# Patient Record
Sex: MALE | Race: WHITE | NOT HISPANIC OR LATINO
[De-identification: names, ages, dates, MRNs, and addresses within clinical notes are randomized per-mention and may not be internally consistent; named-entity substitution may affect disease eponyms.]

---

## 2022-09-27 ENCOUNTER — NON-APPOINTMENT (OUTPATIENT)
Age: 67
End: 2022-09-27

## 2022-09-28 ENCOUNTER — NON-APPOINTMENT (OUTPATIENT)
Age: 67
End: 2022-09-28

## 2022-09-28 ENCOUNTER — APPOINTMENT (OUTPATIENT)
Dept: UROLOGY | Facility: CLINIC | Age: 67
End: 2022-09-28

## 2022-09-28 ENCOUNTER — TRANSCRIPTION ENCOUNTER (OUTPATIENT)
Age: 67
End: 2022-09-28

## 2022-09-28 VITALS
HEIGHT: 72 IN | HEART RATE: 71 BPM | SYSTOLIC BLOOD PRESSURE: 156 MMHG | DIASTOLIC BLOOD PRESSURE: 86 MMHG | BODY MASS INDEX: 20.32 KG/M2 | OXYGEN SATURATION: 98 % | RESPIRATION RATE: 16 BRPM | TEMPERATURE: 97.6 F | WEIGHT: 150 LBS

## 2022-09-28 DIAGNOSIS — K40.90 UNILATERAL INGUINAL HERNIA, W/OUT OBSTRUCTION OR GANGRENE, NOT SPECIFIED AS RECURRENT: ICD-10-CM

## 2022-09-28 DIAGNOSIS — R82.81 PYURIA: ICD-10-CM

## 2022-09-28 DIAGNOSIS — K42.9 UMBILICAL HERNIA W/OUT OBSTRUCTION OR GANGRENE: ICD-10-CM

## 2022-09-28 PROCEDURE — 99204 OFFICE O/P NEW MOD 45 MIN: CPT

## 2022-09-28 NOTE — HISTORY OF PRESENT ILLNESS
[FreeTextEntry1] : 66 YO M seen TODAY 9/28/2022 as NPT for urological evaluation with a history of renal stones in the past. He told me that he developed severe dysuria over Labor Day weekend and was seen in [facility] ER 9/6/2022. Blood work and CT scan were consistent with cystitis/prostatitis and he was treated with Bactrim DS for 14 days.  He continues to have mild LUTS with a slow urinary stream. \par \par CT scan also noted 12 mm left renal calculus and 5 mm right renal calculus, no hydronephrosis, simple right renal cysts. \par UA moderate RBC, LARGE WBC, small protein. \par IPSS 10\par SAMSON 2\par KVNG 5 (Patient not presently engaging in sexual intercourse). \par \par Patient has an autoimmune disease for which he is on chronic cortisone and narcotic analgesia. He is also on gabapentin and thyroid medication. allergic to ibuprofen. He also has bee diagnosed with umbilical and LIH. He recently contracted COVID-19, diagnosed while in the ED. \par \par  The patient denies fevers, chills, nausea and or vomiting and no unexplained weight loss. \par All pertinent parts of the patient PFSH (past medical, family and social histories), laboratory, radiological studies and physician notes were reviewed prior to starting the face to face portion of the  visit. Questionnaire results were discussed with patient.\par

## 2022-09-28 NOTE — LETTER BODY
[Dear  ___] : Dear  [unfilled], [Consult Letter:] : I had the pleasure of evaluating your patient, [unfilled]. [Please see my note below.] : Please see my note below. [Consult Closing:] : Thank you very much for allowing me to participate in the care of this patient.  If you have any questions, please do not hesitate to contact me. [FreeTextEntry3] : Best Regards, \par \par Felicitas Brunson MD\par

## 2022-09-28 NOTE — ASSESSMENT
[FreeTextEntry1] : We discussed the patient's recent episode of urinary tract infection and likely prostatitis.  This may or may not be related to his bilateral renal calculi.  I recommended further evaluation of these 2 issues especially in view of his persistent large pyuria found today.  To that end urine was sent for culture and I recommended that he return for uroflowmetry renal and pelvic ultrasound.  These were scheduled for him.  I would also review the results of the CT scan performed at [facility] on 9 6 and these films are requested.\par \par As for the umbilical and left inguinal hernias, these are asymptomatic patient was instructed with signs and symptoms of incarceration but would like to observe these at the present time.  We made plans for follow-up to review these results when he comes in for the ultrasounds. Felicitas Brunson MD\par

## 2022-09-28 NOTE — PHYSICAL EXAM
[Normal Appearance] : normal appearance [Well Groomed] : well groomed [General Appearance - In No Acute Distress] : no acute distress [Edema] : no peripheral edema [Respiration, Rhythm And Depth] : normal respiratory rhythm and effort [Exaggerated Use Of Accessory Muscles For Inspiration] : no accessory muscle use [Abdomen Soft] : soft [Abdomen Tenderness] : non-tender [Costovertebral Angle Tenderness] : no ~M costovertebral angle tenderness [FreeTextEntry1] : umbilical and LIH, both asymptomatic, neither incarcerated. [Urethral Meatus] : meatus normal [Penis Abnormality] : normal circumcised penis [Urinary Bladder Findings] : the bladder was normal on palpation [Epididymis] : the epididymides were normal [Testes Tenderness] : no tenderness of the testes [Testes Mass (___cm)] : there were no testicular masses [Prostate Tenderness] : the prostate was not tender [No Prostate Nodules] : no prostate nodules [Prostate Size ___ (0-4)] : prostate size [unfilled] (scale: 0-4) [Normal Station and Gait] : the gait and station were normal for the patient's age [] : no rash [No Focal Deficits] : no focal deficits [Oriented To Time, Place, And Person] : oriented to person, place, and time [Affect] : the affect was normal [Mood] : the mood was normal [Not Anxious] : not anxious

## 2022-09-30 LAB
BACTERIA UR CULT: NORMAL
BILIRUB UR QL STRIP: NORMAL
CLARITY UR: CLEAR
COLLECTION METHOD: NORMAL
GLUCOSE UR-MCNC: NORMAL
HCG UR QL: 0.2 EU/DL
HGB UR QL STRIP.AUTO: NORMAL
KETONES UR-MCNC: NORMAL
LEUKOCYTE ESTERASE UR QL STRIP: NORMAL
NITRITE UR QL STRIP: NORMAL
PH UR STRIP: 6
PROT UR STRIP-MCNC: NORMAL
PSA SERPL-MCNC: 5.64 NG/ML
SP GR UR STRIP: 1.02

## 2022-10-27 ENCOUNTER — APPOINTMENT (OUTPATIENT)
Dept: UROLOGY | Facility: CLINIC | Age: 67
End: 2022-10-27

## 2022-10-27 VITALS
SYSTOLIC BLOOD PRESSURE: 153 MMHG | RESPIRATION RATE: 16 BRPM | BODY MASS INDEX: 20.32 KG/M2 | OXYGEN SATURATION: 100 % | HEIGHT: 72 IN | TEMPERATURE: 97.8 F | DIASTOLIC BLOOD PRESSURE: 78 MMHG | HEART RATE: 74 BPM | WEIGHT: 150 LBS

## 2022-10-27 DIAGNOSIS — N41.9 INFLAMMATORY DISEASE OF PROSTATE, UNSPECIFIED: ICD-10-CM

## 2022-10-27 PROCEDURE — 51741 ELECTRO-UROFLOWMETRY FIRST: CPT

## 2022-10-27 PROCEDURE — 76857 US EXAM PELVIC LIMITED: CPT

## 2022-10-27 PROCEDURE — 99214 OFFICE O/P EST MOD 30 MIN: CPT

## 2022-10-27 NOTE — ASSESSMENT
[FreeTextEntry1] : We discussed findings today of the patient's persistent pyuria and hematuria and I recommended repeat culture and cytology today.  We also discussed his borderline elevated PSA I recommended a repeat total and free PSA today also these tests were all performed.  These findings are consistent with his recent episode of cystitis/prostatitis and we will follow the PSA to its dede.\par \par As for his CT findings of bilateral renal calculi, I could not confirm this by reviewing the CT images in the office today however he will email those to me this evening and hopefully we will be able to confirm these images.  We were unable to get the images released from [facility] as of today.  We discussed treatment of the stones and the best likely approach would be cystoscopy bilateral ureteroscopy laser lithotripsy stone basket extraction and JJ stent placement.  We did not make plans pending review of the images.  We discussed the indications risks alternatives and chances for success with this approach and the patient is in agreement with this treatment plan.  We will finalize the plan and pick a date after reviewing the scan results.  Surgery will be scheduled for after his prostatitis is completely resolved and PSA has returned to baseline. Felicitas Brunson MD\par

## 2022-10-27 NOTE — LETTER BODY
[Dear  ___] : Dear  [unfilled], [Courtesy Letter:] : I had the pleasure of seeing your patient, [unfilled], in my office today. [Please see my note below.] : Please see my note below. [Consult Closing:] : Thank you very much for allowing me to participate in the care of this patient.  If you have any questions, please do not hesitate to contact me. [FreeTextEntry3] : Best Regards, \par \par Felicitas Brunson MD\par

## 2022-10-27 NOTE — HISTORY OF PRESENT ILLNESS
[FreeTextEntry1] : 66 YO M seen  9/28/2022 as NPT for urological evaluation with a history of renal stones in the past. He told me that he developed severe dysuria over Labor Day weekend and was seen in [facility] ER 9/6/2022. Blood work and CT scan were consistent with cystitis/prostatitis and he was treated with Bactrim DS for 14 days.  He continues to have mild LUTS with a slow urinary stream. \par \par CT scan also noted 12 mm left renal calculus and 5 mm right renal calculus, no hydronephrosis, simple right renal cysts. \par UA moderate RBC, LARGE WBC, small protein. \par IPSS 10\par SAMSON 2\par KVNG 5 (Patient not presently engaging in sexual intercourse). \par We discussed the patient's recent episode of urinary tract infection and likely prostatitis. This may or may not be related to his bilateral renal calculi. I recommended further evaluation of these 2 issues especially in view of his persistent large pyuria found today. To that end urine was sent for culture and I recommended that he return for uroflowmetry renal and pelvic ultrasound. These were scheduled for him. I would also review the results of the CT scan performed at [facility] on 9 6 and these films are requested.\par As for the umbilical and left inguinal hernias, these are asymptomatic patient was instructed with signs and symptoms of incarceration but would like to observe these at the present time. We made plans for follow-up to review these results when he comes in for the ultrasounds. \par C+S negative\par PSA 5.64\par Patient seen TODAY 10/27/2022 for repeat PSA, uroflow, pelvic US and to discuss treatment options for his bilateral renal calculi. He remains asymptomatic from the stones. He has the images on his phone and will email them to me this evening. \par UA  small RBC, large WBC.\par URoflow good: Vol 369 ml, V Max 15 ml/s, V Ave 7 ml/s.\par Pelvic US: PVR 20 ml, PRostate 39 gm.\par IPSS 2\par \par Patient has an autoimmune disease for which he is on chronic cortisone and narcotic analgesia. He is also on gabapentin and thyroid medication. allergic to ibuprofen. He also has bee diagnosed with umbilical and LIH. He recently contracted COVID-19, diagnosed while in the ED. \par  The patient denies fevers, chills, nausea and or vomiting and no unexplained weight loss. \par All pertinent parts of the patient PFSH (past medical, family and social histories), laboratory, radiological studies and physician notes were reviewed prior to starting the face to face portion of the  visit. Questionnaire results were discussed with patient.\par

## 2022-10-28 ENCOUNTER — NON-APPOINTMENT (OUTPATIENT)
Age: 67
End: 2022-10-28

## 2022-10-28 LAB
BILIRUB UR QL STRIP: NORMAL
CLARITY UR: CLEAR
COLLECTION METHOD: NORMAL
GLUCOSE UR-MCNC: NORMAL
HCG UR QL: 0.2 EU/DL
HGB UR QL STRIP.AUTO: NORMAL
KETONES UR-MCNC: NORMAL
LEUKOCYTE ESTERASE UR QL STRIP: NORMAL
NITRITE UR QL STRIP: NORMAL
PH UR STRIP: 6
PROT UR STRIP-MCNC: NORMAL
PSA FREE FLD-MCNC: 8 %
PSA FREE SERPL-MCNC: 0.37 NG/ML
PSA SERPL-MCNC: 4.55 NG/ML
SP GR UR STRIP: 1.01

## 2022-11-04 LAB — BACTERIA UR CULT: ABNORMAL

## 2022-11-14 ENCOUNTER — APPOINTMENT (OUTPATIENT)
Dept: MRI IMAGING | Facility: CLINIC | Age: 67
End: 2022-11-14

## 2023-01-09 ENCOUNTER — OUTPATIENT (OUTPATIENT)
Dept: OUTPATIENT SERVICES | Facility: HOSPITAL | Age: 68
LOS: 1 days | End: 2023-01-09

## 2023-01-09 ENCOUNTER — RESULT REVIEW (OUTPATIENT)
Age: 68
End: 2023-01-09

## 2023-01-09 ENCOUNTER — APPOINTMENT (OUTPATIENT)
Dept: MRI IMAGING | Facility: CLINIC | Age: 68
End: 2023-01-09
Payer: MEDICARE

## 2023-01-09 ENCOUNTER — APPOINTMENT (OUTPATIENT)
Dept: ULTRASOUND IMAGING | Facility: CLINIC | Age: 68
End: 2023-01-09
Payer: MEDICARE

## 2023-01-09 PROCEDURE — 72197 MRI PELVIS W/O & W/DYE: CPT | Mod: 26,MH

## 2023-01-09 PROCEDURE — 76856 US EXAM PELVIC COMPLETE: CPT | Mod: 26

## 2023-01-11 ENCOUNTER — NON-APPOINTMENT (OUTPATIENT)
Age: 68
End: 2023-01-11

## 2023-01-20 ENCOUNTER — APPOINTMENT (OUTPATIENT)
Dept: UROLOGY | Facility: CLINIC | Age: 68
End: 2023-01-20
Payer: MEDICARE

## 2023-01-20 PROCEDURE — 99214 OFFICE O/P EST MOD 30 MIN: CPT

## 2023-01-20 NOTE — HISTORY OF PRESENT ILLNESS
[FreeTextEntry1] : 66 YO M seen  9/28/2022 as NPT for urological evaluation with a history of renal stones in the past. He told me that he developed severe dysuria over Labor Day weekend and was seen in [facility] ER 9/6/2022. Blood work and CT scan were consistent with cystitis/prostatitis and he was treated with Bactrim DS for 14 days.  He continues to have mild LUTS with a slow urinary stream. \par \par CT scan also noted 12 mm left renal calculus and 5 mm right renal calculus, no hydronephrosis, simple right renal cysts. \par UA moderate RBC, LARGE WBC, small protein. \par IPSS 10\par SAMSON 2\par KVNG 5 (Patient not presently engaging in sexual intercourse). \par We discussed the patient's recent episode of urinary tract infection and likely prostatitis. This may or may not be related to his bilateral renal calculi. I recommended further evaluation of these 2 issues especially in view of his persistent large pyuria found today. To that end urine was sent for culture and I recommended that he return for uroflowmetry renal and pelvic ultrasound. These were scheduled for him. I would also review the results of the CT scan performed at [facility] on 9 6 and these films are requested.\par As for the umbilical and left inguinal hernias, these are asymptomatic patient was instructed with signs and symptoms of incarceration but would like to observe these at the present time. We made plans for follow-up to review these results when he comes in for the ultrasounds. \par C+S negative\par PSA 5.64\par Patient seen  10/27/2022 for repeat PSA, uroflow, pelvic US and to discuss treatment options for his bilateral renal calculi. He remains asymptomatic from the stones. He has the images on his phone and will email them to me this evening. \par UA  small RBC, large WBC.\par URoflow good: Vol 369 ml, V Max 15 ml/s, V Ave 7 ml/s.\par Pelvic US: PVR 20 ml, PRostate 39 gm.\par IPSS 2We discussed findings today of the patient's persistent pyuria and hematuria and I recommended repeat culture and cytology today. We also discussed his borderline elevated PSA I recommended a repeat total and free PSA today also these tests were all performed. These findings are consistent with his recent episode of cystitis/prostatitis and we will follow the PSA to its dede.\par \par As for his CT findings of bilateral renal calculi, I could not confirm this by reviewing the CT images in the office today however he will email those to me this evening and hopefully we will be able to confirm these images. We were unable to get the images released from [facility] as of today. We discussed treatment of the stones and the best likely approach would be cystoscopy bilateral ureteroscopy laser lithotripsy stone basket extraction and JJ stent placement. We did not make plans pending review of the images. We discussed the indications risks alternatives and chances for success with this approach and the patient is in agreement with this treatment plan. We will finalize the plan and pick a date after reviewing the scan results. Surgery will be scheduled for after his prostatitis is completely resolved and PSA has returned to baseline. \par PSA 4.55 (8%)\par C+S 10 - 49 K candida albicans Rx'd wit Fluconazole. \par \par 10/28/2022  PSA returned improved but still high at 4.55.\par CT scans reviewed via email from patient. multiple stones in the left kidney, at least 1 in the right kidney. No Hydro or renal masses noted. MAss effect in area of the prostatic base ans left SV. I phoned patient, left VM with results and recommended prostate and seminal vesical MRI before we treat his bilateral renal calculi.. \par \par MRI 1/9/2023:\par FINDINGS:\par Size: 4.8 x 3.8 x 3.3 [transverse x AP x CC] cm.\par Volume: 31.4 mL .\par PSA density: 0.14 ng/mL/mL\par PSA density >0.15 ng/mL/mL: No\par Hemorrhage: None.\par Central gland: Mild BPH.\par Peripheral zone:\par LESION: #1\par Location: Left lateral peripheral zone at apex\par Slice#: Series seven, Image 22.\par Size (transverse, AP, ): 4.2 x 4 mm.\par T2-WI: Moderately hypointense\par DWI: Moderately hypointense on ADC map and moderately hyperintense on DWI.\par DCE: Early focal enhancement\par Extra-prostatic extension: None\par PI-RADS Assessment Category: 4\par Neurovascular bundle: No evidence of neurovascular bundle invasion.\par Seminal vesicles: No seminal vesicle invasion. Blood products in the right seminal vesicle.\par Lymph nodes: No pelvic adenopathy.\par Bones: 1.2 cm enhancing focus in the right posterior acetabulum-indeterminate. Correlate with CT and isotope bone scan.\par Urinary bladder: Mild diffuse urothelial enhancement.\par Other:\par IMPRESSION:PIRADS 4 -high. Small nodule left lateral peripheral zone at apex.\par Normal PSA density.\par Hemorrhage in the right seminal vesicle.\par See further comments above. \par \par Patient seen TODAY 1/20/2023 to review and discuss these results. He spontaneously passed a stone since last visit. No other issues.\par UA moderate RBC\par \par Patient has an autoimmune disease for which he is on chronic cortisone and narcotic analgesia. He is also on gabapentin and thyroid medication. allergic to ibuprofen. He also has bee diagnosed with umbilical and LIH. He recently contracted COVID-19, diagnosed while in the ED. \par  The patient denies fevers, chills, nausea and or vomiting and no unexplained weight loss. \par All pertinent parts of the patient PFSH (past medical, family and social histories), laboratory, radiological studies and physician notes were reviewed prior to starting the face to face portion of the  visit. Questionnaire results were discussed with patient.\par

## 2023-01-20 NOTE — ASSESSMENT
[FreeTextEntry1] : We discussed the results of the MRI which returned PI-RADS =4.  I recommended that he consider a prostate fusion biopsy and we discussed the risks alternatives the chance for success along with the indications including the abnormal MRI and elevated PSA. We also discussed the PI-RADS  rating, and the different types of potential cancer that might be found.   He is in agreement with this and we made plans for him to be seen and biopsied by my partner, Dr. Alfredo Masters.  Staff will contact him and confirm this appointment.\par \par As for the kidney stones, we will address these after the prostate issue has been resolved.  In the meanwhile I asked him to bring in the stone that he passed for analysis. Felicitas Brunson MD\par

## 2023-03-09 ENCOUNTER — LABORATORY RESULT (OUTPATIENT)
Age: 68
End: 2023-03-09

## 2023-03-13 ENCOUNTER — APPOINTMENT (OUTPATIENT)
Dept: UROLOGY | Facility: CLINIC | Age: 68
End: 2023-03-13
Payer: MEDICARE

## 2023-03-13 VITALS — DIASTOLIC BLOOD PRESSURE: 70 MMHG | SYSTOLIC BLOOD PRESSURE: 116 MMHG | HEART RATE: 83 BPM | TEMPERATURE: 98.5 F

## 2023-03-13 PROCEDURE — 99214 OFFICE O/P EST MOD 30 MIN: CPT

## 2023-03-13 NOTE — PHYSICAL EXAM
[General Appearance - Well Developed] : well developed [General Appearance - Well Nourished] : well nourished [] : no respiratory distress [Abdomen Soft] : soft [Urethral Meatus] : meatus normal [Penis Abnormality] : normal circumcised penis [Testes Tenderness] : no tenderness of the testes [Prostate Tenderness] : the prostate was not tender [No Prostate Nodules] : no prostate nodules [Oriented To Time, Place, And Person] : oriented to person, place, and time [Not Anxious] : not anxious

## 2023-03-13 NOTE — ASSESSMENT
[FreeTextEntry1] : 67M w/ an elevated PSA and MRI showing a PIRADS 4 lesion L apex pzpl, however hx complicated by adrenal insufficiency.  The paitent while on ABX PSA dropped to 2.11 while having  UTI 3/9/23 (2022 Labor Day, ?? others). \par \par 1. UTI - 24 cc prostate - PVR - 0cc\par    - not sexual active\par    - UA UCx\par    - PVR and Flow at next visit\par \par Elevated PSA / Bone Lesion\par 1. confirm elevated - drawn today - we will have to determine next steps and let patient know\par 2. Indeterminate bony lesion-PSMA PET/CT after biopsy. Bone scan if negative pathology\par 3, BIOPSY ON HOLD UNTIL UTI has run its course.\par \par Pre-op\par - CBC, BMP, PSA, Covid Test, UA, UCx\par - Medical Clearance\par - TP biopsy at [facility]\par - Follow up 2 weeks after biopsy with his primary urologist or ourselves\par - We will call with the path results once they are resulted\par \par IRB Notification\par The patient has been made aware of the opportunity to participate in our MR US fusion guided biopsy trial testing the next generation biopsy technology.  This is an IRB approved trial (IRB #).  He is already being consented for a standard MR US fusion guided biopsy therefore there is no change in his clinical work flow.  He has been given a copy of the consent to review before the biopsy date and given an opportunity to contact us with any further questions.  He will be consented on the day of the procedure or electronically before the biopsy.\par \par He understands that many men with prostate cancer will die with the disease rather than of it and we also discussed the results large multi-center American and  prostate cancer screening trials. He also understands that PSA in and of itself does not diagnose prostate cancer but only assesses risk to a certain degree. The patient understands that to definitively screen for prostate cancer, a biopsy is required and this procedure has risks, including bleeding, infection, ED and urinary retention. The patient opted to move forward with the biopsy.\par \par The patient is aware to expect hematuria x 2 weeks and up to 4 weeks of hematospermia.  There is a risk of infection albeit much lower than a transrectal approach. In some cases patients can experience erectile dysfunction but this is usually self limiting.  Any fever/chills after the biopsy the patient is to contact the office and go to the ER for an immediate evaluation. He has been given paper instructions outlining these items - which includes medications to avoid prior to surgery.\par \par \par Sincerely,\par \par \par Alfredo Masters D.O.\par Professor of Urology and Radiology\par  of Urology at [facility]\par System Director for Prostate Cancer\par [address] [place] [address], [address] Floor [facility], [facility]\par Phone: [phone]

## 2023-03-13 NOTE — HISTORY OF PRESENT ILLNESS
[FreeTextEntry1] : Dear MERCEDES Iqbal\par \par Thank you so much for the referral to help care for your patient.\par \par Chief Complaint Biopsy Consult\par Date of first visit: 03/13/2023\par \par KEVIN HOLLEY is a 67 year old  gentleman who presents for biopsy consult.  He has Churg Michael disease (auto-immune vasculitis).  The patient was treated for a recent UTI and his PSA dropped to 2.11 3/9/23. The patient also has adrenal insufficiency.   \par \par His PSA is 2.1 ng/ml. MRI on 1/9/23 demonstrated a PIRADS 4 lesion left apex pzpl. There is a 1.2cm enhancing focus in the right acetabulum which is indeterminate. His PSA density is normal (0.06 ng/ml/cc). \par \par PSA Hx:\par 2.1 3/9/23. PSAD 0.06 ng/lm/cc\par 4.55  10/27/22 (urinary tract infection)\par 5.64  9/28/22\par \par US Pelvis - 1/9/2023-\par Prostate volume within normal limits. Probable dystrophic parenchymal calcification. No fluid collections.\par \par MRI at [facility] on 1/09/2023.  31.4 mL prostate with PIRADS 4 lesion measuring 4.2 mm in Left lateral peripheral zone at apex. No LAD No EPE, Bony Lesions : 1.2 cm enhancing focus in the right posterior acetabulum-indeterminate. Correlate with CT and isotope bone scan.  The images have been reviewed and clinical implications discussed with the patient.\par \par 03/13/2023\par IPSS 5 QOL 5   \par PVR - 0cc \par \par The patient denies fevers, chills, nausea and or vomiting and no unexplained weight loss.\par \par All pertinent laboratory, films and physician notes were reviewed.  Questionnaire results were discussed with patient.\par \par \par

## 2023-03-14 LAB
ANION GAP SERPL CALC-SCNC: 15 MMOL/L
APPEARANCE: ABNORMAL
BACTERIA: NEGATIVE
BASOPHILS # BLD AUTO: 0.05 K/UL
BASOPHILS NFR BLD AUTO: 0.7 %
BILIRUBIN URINE: ABNORMAL
BLOOD URINE: ABNORMAL
BUN SERPL-MCNC: 24 MG/DL
CALCIUM OXALATE CRYSTALS: ABNORMAL
CALCIUM SERPL-MCNC: 9.6 MG/DL
CHLORIDE SERPL-SCNC: 100 MMOL/L
CO2 SERPL-SCNC: 26 MMOL/L
COLOR: ABNORMAL
CREAT SERPL-MCNC: 1.18 MG/DL
EGFR: 68 ML/MIN/1.73M2
EOSINOPHIL # BLD AUTO: 0.03 K/UL
EOSINOPHIL NFR BLD AUTO: 0.4 %
GLUCOSE QUALITATIVE U: NEGATIVE
GLUCOSE SERPL-MCNC: 115 MG/DL
HCT VFR BLD CALC: 41 %
HGB BLD-MCNC: 12.6 G/DL
HYALINE CASTS: 10 /LPF
IMM GRANULOCYTES NFR BLD AUTO: 0.4 %
KETONES URINE: NORMAL
LEUKOCYTE ESTERASE URINE: ABNORMAL
LYMPHOCYTES # BLD AUTO: 0.97 K/UL
LYMPHOCYTES NFR BLD AUTO: 13 %
MAN DIFF?: NORMAL
MCHC RBC-ENTMCNC: 29.8 PG
MCHC RBC-ENTMCNC: 30.7 GM/DL
MCV RBC AUTO: 96.9 FL
MICROSCOPIC-UA: NORMAL
MONOCYTES # BLD AUTO: 0.63 K/UL
MONOCYTES NFR BLD AUTO: 8.4 %
NEUTROPHILS # BLD AUTO: 5.76 K/UL
NEUTROPHILS NFR BLD AUTO: 77.1 %
NITRITE URINE: NEGATIVE
PH URINE: 5.5
PLATELET # BLD AUTO: 487 K/UL
POTASSIUM SERPL-SCNC: 4.3 MMOL/L
PROTEIN URINE: ABNORMAL
PSA FREE FLD-MCNC: 14 %
PSA FREE SERPL-MCNC: 0.34 NG/ML
PSA SERPL-MCNC: 2.45 NG/ML
RBC # BLD: 4.23 M/UL
RBC # FLD: 12 %
RED BLOOD CELLS URINE: 4 /HPF
SODIUM SERPL-SCNC: 141 MMOL/L
SPECIFIC GRAVITY URINE: >=1.03
SQUAMOUS EPITHELIAL CELLS: 0 /HPF
URINE COMMENTS: NORMAL
UROBILINOGEN URINE: NORMAL
WBC # FLD AUTO: 7.47 K/UL
WHITE BLOOD CELLS URINE: 155 /HPF

## 2023-03-15 LAB — BACTERIA UR CULT: NORMAL

## 2023-03-17 ENCOUNTER — NON-APPOINTMENT (OUTPATIENT)
Age: 68
End: 2023-03-17

## 2023-03-30 ENCOUNTER — NON-APPOINTMENT (OUTPATIENT)
Age: 68
End: 2023-03-30

## 2023-03-30 DIAGNOSIS — Z82.0 FAMILY HISTORY OF EPILEPSY AND OTHER DISEASES OF THE NERVOUS SYSTEM: ICD-10-CM

## 2023-03-30 DIAGNOSIS — Z87.09 PERSONAL HISTORY OF OTHER DISEASES OF THE RESPIRATORY SYSTEM: ICD-10-CM

## 2023-03-30 DIAGNOSIS — M30.1 POLYARTERITIS WITH LUNG INVOLVEMENT [CHURG-STRAUSS]: ICD-10-CM

## 2023-03-30 DIAGNOSIS — Z82.49 FAMILY HISTORY OF ISCHEMIC HEART DISEASE AND OTHER DISEASES OF THE CIRCULATORY SYSTEM: ICD-10-CM

## 2023-03-30 DIAGNOSIS — D72.18 POLYARTERITIS WITH LUNG INVOLVEMENT [CHURG-STRAUSS]: ICD-10-CM

## 2023-03-30 RX ORDER — LOSARTAN POTASSIUM AND HYDROCHLOROTHIAZIDE 12.5; 5 MG/1; MG/1
50-12.5 TABLET ORAL DAILY
Refills: 0 | Status: ACTIVE | COMMUNITY

## 2023-03-30 RX ORDER — LEVOTHYROXINE SODIUM 88 UG/1
88 CAPSULE ORAL DAILY
Refills: 0 | Status: ACTIVE | COMMUNITY

## 2023-04-10 ENCOUNTER — OUTPATIENT (OUTPATIENT)
Dept: OUTPATIENT SERVICES | Facility: HOSPITAL | Age: 68
LOS: 1 days | End: 2023-04-10
Payer: MEDICARE

## 2023-04-10 ENCOUNTER — APPOINTMENT (OUTPATIENT)
Dept: UROLOGY | Facility: CLINIC | Age: 68
End: 2023-04-10
Payer: MEDICARE

## 2023-04-10 VITALS
BODY MASS INDEX: 20.32 KG/M2 | TEMPERATURE: 98.6 F | OXYGEN SATURATION: 100 % | HEIGHT: 72 IN | SYSTOLIC BLOOD PRESSURE: 136 MMHG | WEIGHT: 150 LBS | DIASTOLIC BLOOD PRESSURE: 78 MMHG | HEART RATE: 69 BPM

## 2023-04-10 PROCEDURE — 99214 OFFICE O/P EST MOD 30 MIN: CPT

## 2023-04-10 PROCEDURE — 71046 X-RAY EXAM CHEST 2 VIEWS: CPT | Mod: 26

## 2023-04-10 PROCEDURE — 71046 X-RAY EXAM CHEST 2 VIEWS: CPT

## 2023-04-10 RX ORDER — GABAPENTIN 300 MG/1
300 CAPSULE ORAL
Qty: 90 | Refills: 0 | Status: ACTIVE | COMMUNITY
Start: 2022-05-06

## 2023-04-10 RX ORDER — MORPHINE SULFATE 15 MG/1
15 TABLET ORAL
Refills: 0 | Status: ACTIVE | COMMUNITY

## 2023-04-10 RX ORDER — FLUCONAZOLE 150 MG/1
150 TABLET ORAL
Qty: 2 | Refills: 1 | Status: COMPLETED | COMMUNITY
Start: 2022-11-04 | End: 2023-04-10

## 2023-04-10 NOTE — HISTORY OF PRESENT ILLNESS
[FreeTextEntry1] : Dear MERCEDES Iqbal\par \par Thank you so much for the referral to help care for your patient.\par \par Chief Complaint Biopsy Consult\par Date of first visit: 03/13/2023\par \par KEVIN HOLLEY is a 67 year old  gentleman who presents for biopsy consult.  He has Churg Michael disease (auto-immune vasculitis).  The patient was treated for a recent UTI and his PSA dropped to 2.11 3/9/23. The patient also has adrenal insufficiency.   \par \par His PSA is 2.4 ng/ml. MRI on 1/9/23 demonstrated a PIRADS 4 lesion left apex pzpl. There is a 1.2cm enhancing focus in the right acetabulum which is indeterminate. His PSA density is normal (0.06 ng/ml/cc). \par \par Very minimal urinary symptoms but is not happy he wakes up once at night. He does have frequency but stays well hydrated.\par \par PSA Hx:\par 2.45 03/13/2023\par 2.1 3/9/23. PSAD 0.06 ng/lm/cc\par 4.55  10/27/22 (urinary tract infection)\par 5.64  9/28/22\par \par US Pelvis - 1/9/2023-\par Prostate volume within normal limits. Probable dystrophic parenchymal calcification. No fluid collections.\par \par MRI at [facility] on 1/09/2023.  31.4 mL prostate with PIRADS 4 lesion measuring 4.2 mm in Left lateral peripheral zone at apex. No LAD No EPE, Bony Lesions : 1.2 cm enhancing focus in the right posterior acetabulum-indeterminate. Correlate with CT and isotope bone scan.  The images have been reviewed and clinical implications discussed with the patient.\par \par 04/10/2023\par IPSS 4  QOL  5\par KVNG 5-NSA\par Flow/PVR vv not valid. PVR 1 cc\par \par 03/13/2023\par IPSS 5 QOL 5   \par PVR - 0cc \par \par The patient denies fevers, chills, nausea and or vomiting and no unexplained weight loss.\par \par All pertinent laboratory, films and physician notes were reviewed.  Questionnaire results were discussed with patient.\par \par I spent 31 minutes of non-face to face time reviewing the patient's records, chart, uploading processing MR images, transferring MR images from PACS to an independent workstation, reviewing images on independent workstation, speaking with their physician and planning their prostate biopsy and preparation of an upcoming visit for 04/10/2023 .  The imaging and planning was highly complex and took this entire time. \par \par \par

## 2023-04-10 NOTE — ADDENDUM
[FreeTextEntry1] : IRB   MR/TRUS FUSION GUIDED PROSTATE BIOPSY- AN IMPROVED WAY TO DETECT AND QUANTIFY PROSTATE CANCER\par \par Inclusion criteria have been reviewed and it has been determined that the subject has met all inclusion criteria.\par Exclusion criteria have been reviewed and it has been determined that the subject does not meet any exclusion criteria.\par \par The following was discussed during the consent process:\par ·	The fact that the study involves research\par ·	The study schedule and procedures involved\par ·	The main risks of the study, and the fact that all risks may not be known at this time\par ·	New information that may affect the subject’s willingness to continue on the study will be presented as soon as it is available\par ·	Benefits of participating\par ·	Alternatives to participating\par ·	Confidentiality \par ·	Compensation for research-related injury\par ·	Contacts for questions about the study or their rights while on the study\par ·	The fact that the subject’s participation is voluntary - they can refuse or withdraw \par at any time without penalty or loss of benefits.\par \par The subject was given ample time to ask questions prior to signing - all questions were answered to the subject’s satisfaction.\par \par Contact information for research staff was given to the subject.	\par The subject has expressed his/her willingness to participate.	\par \par Opportunity to sign the consent electronically was offered to the subject. Study team to contact the subject to assist with e-consenting though the [employer] platform. \par \par OR  \par \par Subject will sign printed consent on day of procedure.  \par \par \par I, Dr. Masters, personally performed the evaluation and management (E/M) services for this established patient who presents today with (a) new problem(s)/exacerbation of (an) existing condition(s).  That E/M includes conducting the examination, assessing all new/exacerbated conditions, and establishing a new plan of care.  Today, my ACP, Holley Rebollar, was here to observe my evaluation and management services for this new problem/exacerbated condition to be followed going forward.\par

## 2023-04-10 NOTE — ASSESSMENT
[FreeTextEntry1] : 67M w/ an elevated PSA and MRI showing a PIRADS 4 lesion L apex pzpl, however hx complicated by adrenal insufficiency.  The paitent while on ABX PSA dropped to 2.11 while having  UTI 3/9/23 (2022 Labor Day, ?? others). \par \par Hx of UTI \par - not sexual active\par - UA/UCx for test of cure \par - MRI was done at time of no infx/symptoms\par \par BPH/LUTS\par - PVR and Flow today- vv not valid. PVR 1cc\par \par Elevated PSA / Bone Lesion\par - Was falsely elevated confirm elevated - drawn 3/13/23 and confirmed <2.5 ng/ml\par -if + pathology , he will need PSMA PET/CT. If pathology negative, NM bone scan\par \par IRB Notification\par \par The patient has been made aware of the opportunity to participate in our MR US fusion guided biopsy trial testing the next generation biopsy technology.  This is an IRB approved trial (IRB #).  He is already being consented for a standard MR US fusion guided biopsy therefore there is no change in his clinical work flow.  He has been given a copy of the consent to review before the biopsy date and given an opportunity to contact us with any further questions.  He will be consented on the day of the procedure or electronically before the biopsy.\par \par He understands that many men with prostate cancer will die with the disease rather than of it and we also discussed the results large multi-center American and  prostate cancer screening trials. He also understands that PSA in and of itself does not diagnose prostate cancer but only assesses risk to a certain degree. The patient understands that to definitively screen for prostate cancer, a biopsy is required and this procedure has risks, including bleeding, infection, ED and urinary retention. The patient opted to move forward with the biopsy.\par \par The patient is aware to expect hematuria x 2 weeks and upto 4 weeks of hematospermia.  There is a risk of infection albeit much lower than a transrectal approach. In some cases patients can experience erectile dysfunction but this is usually self limiting.  Any fever/chills after the biopsy the patient is to contact the office and go to the ER for an immediate evaluation. He has been given paper instructions outlining these items - which includes medications to avoid prior to surgery.\par \par 1. CBC, BMP, UA UCx. CXR within past year (hx asthma)\par 2. Medical Clearance\par 3. TP biopsy at [facility]\par 4. follow up 2 weeks after biopsy with his primary urologist or ourselves.\par 5. we will call with the path results once they are resulted.\par \par Thank you very much for allowing me to assist in the care of this patient. Should you have any additional questions or concerns please do not hesitate to contact me.\par \par \par Sincerely,\par \par \par Alfredo Masters D.O.\par Professor of Urology and Radiology\par  of Urology at [facility]\par System Director for Prostate Cancer\par [address] [facility]\par Phone: [phone]\par

## 2023-04-10 NOTE — PHYSICAL EXAM
[General Appearance - Well Developed] : well developed [General Appearance - Well Nourished] : well nourished [Normal Appearance] : normal appearance [Well Groomed] : well groomed [General Appearance - In No Acute Distress] : no acute distress [] : no respiratory distress [Respiration, Rhythm And Depth] : normal respiratory rhythm and effort [Exaggerated Use Of Accessory Muscles For Inspiration] : no accessory muscle use [Oriented To Time, Place, And Person] : oriented to person, place, and time [Affect] : the affect was normal [Mood] : the mood was normal [Not Anxious] : not anxious [Normal Station and Gait] : the gait and station were normal for the patient's age [No Focal Deficits] : no focal deficits [FreeTextEntry1] : neg AUTUMN 3/13/23

## 2023-04-11 LAB
ANION GAP SERPL CALC-SCNC: 16 MMOL/L
APPEARANCE: ABNORMAL
BACTERIA: NEGATIVE
BILIRUBIN URINE: NEGATIVE
BLOOD URINE: ABNORMAL
BUN SERPL-MCNC: 15 MG/DL
CALCIUM SERPL-MCNC: 9.2 MG/DL
CHLORIDE SERPL-SCNC: 98 MMOL/L
CO2 SERPL-SCNC: 23 MMOL/L
COLOR: YELLOW
CREAT SERPL-MCNC: 0.91 MG/DL
EGFR: 92 ML/MIN/1.73M2
GLUCOSE QUALITATIVE U: NEGATIVE
GLUCOSE SERPL-MCNC: 108 MG/DL
HCT VFR BLD CALC: 39.1 %
HGB BLD-MCNC: 12.1 G/DL
HYALINE CASTS: 3 /LPF
KETONES URINE: NEGATIVE
LEUKOCYTE ESTERASE URINE: ABNORMAL
MCHC RBC-ENTMCNC: 29.7 PG
MCHC RBC-ENTMCNC: 30.9 GM/DL
MCV RBC AUTO: 96.1 FL
MICROSCOPIC-UA: NORMAL
NITRITE URINE: NEGATIVE
PH URINE: 6
PLATELET # BLD AUTO: 320 K/UL
POTASSIUM SERPL-SCNC: 4.2 MMOL/L
PROTEIN URINE: ABNORMAL
RBC # BLD: 4.07 M/UL
RBC # FLD: 12.6 %
RED BLOOD CELLS URINE: 18 /HPF
SODIUM SERPL-SCNC: 137 MMOL/L
SPECIFIC GRAVITY URINE: 1.02
SQUAMOUS EPITHELIAL CELLS: 0 /HPF
UROBILINOGEN URINE: NORMAL
WBC # FLD AUTO: 5.69 K/UL
WHITE BLOOD CELLS URINE: 267 /HPF

## 2023-04-18 ENCOUNTER — TRANSCRIPTION ENCOUNTER (OUTPATIENT)
Age: 68
End: 2023-04-18

## 2023-04-18 ENCOUNTER — RESULT REVIEW (OUTPATIENT)
Age: 68
End: 2023-04-18

## 2023-04-18 LAB — BACTERIA UR CULT: ABNORMAL

## 2023-04-18 RX ORDER — FLUCONAZOLE 200 MG/1
200 TABLET ORAL DAILY
Qty: 10 | Refills: 0 | Status: ACTIVE | COMMUNITY
Start: 2023-04-18 | End: 1900-01-01

## 2023-04-24 ENCOUNTER — APPOINTMENT (OUTPATIENT)
Dept: CT IMAGING | Facility: CLINIC | Age: 68
End: 2023-04-24
Payer: MEDICARE

## 2023-04-24 ENCOUNTER — OUTPATIENT (OUTPATIENT)
Dept: OUTPATIENT SERVICES | Facility: HOSPITAL | Age: 68
LOS: 1 days | End: 2023-04-24

## 2023-04-24 PROCEDURE — 74176 CT ABD & PELVIS W/O CONTRAST: CPT | Mod: 26,MH

## 2023-04-26 ENCOUNTER — APPOINTMENT (OUTPATIENT)
Dept: INTERNAL MEDICINE | Facility: CLINIC | Age: 68
End: 2023-04-26
Payer: MEDICARE

## 2023-04-26 ENCOUNTER — APPOINTMENT (OUTPATIENT)
Dept: UROLOGY | Facility: CLINIC | Age: 68
End: 2023-04-26
Payer: MEDICARE

## 2023-04-26 VITALS
RESPIRATION RATE: 13 BRPM | TEMPERATURE: 98.3 F | DIASTOLIC BLOOD PRESSURE: 70 MMHG | HEIGHT: 72 IN | OXYGEN SATURATION: 96 % | WEIGHT: 146 LBS | BODY MASS INDEX: 19.77 KG/M2 | SYSTOLIC BLOOD PRESSURE: 128 MMHG | HEART RATE: 61 BPM

## 2023-04-26 DIAGNOSIS — N39.0 URINARY TRACT INFECTION, SITE NOT SPECIFIED: ICD-10-CM

## 2023-04-26 PROCEDURE — 93000 ELECTROCARDIOGRAM COMPLETE: CPT

## 2023-04-26 PROCEDURE — 99214 OFFICE O/P EST MOD 30 MIN: CPT | Mod: 25

## 2023-04-26 PROCEDURE — 99214 OFFICE O/P EST MOD 30 MIN: CPT

## 2023-04-26 RX ORDER — MEPOLIZUMAB 100 MG/ML
100 INJECTION, POWDER, FOR SOLUTION SUBCUTANEOUS
Refills: 0 | Status: ACTIVE | COMMUNITY
Start: 2023-04-26

## 2023-04-26 NOTE — HISTORY OF PRESENT ILLNESS
[FreeTextEntry1] : Name KEVIN HOLLEY \par MRN [id number] \par  Jul 1955 \par ------------------------------------------------------------------------------------------------------------------------------------------- \par Date of First Visit: 23\par Referring Provider/PCP: Guerrero / Dr. Izaguirre\par ------------------------------------------------------------------------------------------------------------------------------------------- \par CC: kidney stones \par \par History of Present Illness: KEVIN HOLLEY is a 67 year M who presents for evaluation of kidney stones. He has had recurrent UTI, CT shows 1.2 cm of stone in the mid left ureter, with numerous stones (largest > 1.3 cm) in the left kidney, as well as a 4-5 mm non-obstructing stone in the right kidney. 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 \par \par Imaging: CT scan from 23 can be found in Net 263 PACS. Findings: Evaluation of the kidneys demonstrates bilateral renal cysts. There are bilateral renal calculi, greater in number on the left than on the right with a small staghorn calculus the lower pole of the left kidney measuring 1.3 cm. There is moderate left hydroureter secondary to clustered obstructing calculi within the mid left ureter spanning 1.2 cm with surrounding ureteral thickening (image 43, coronal). There is mild edematous infiltration surrounding the left kidney.\par \par \par Previous urine cultures:  \par \par Kidney Stone History:  \par First-time stone former - yes\par Concurrent asymptomatic stone(s) - yes\par Previous stone surgeries - no\par Comorbidities – non-contributory \par Family history of kidney stones - sister\par Previous metabolic evaluation - no\par

## 2023-04-26 NOTE — HISTORY OF PRESENT ILLNESS
[FreeTextEntry1] : Here for rx Managment /Pre-op\par Due for kidney stone removal on 5/22 @  [address] [place] [address] Dr Brunson\par Denies any distress or discomfort\par Using Fluconazole x 10 days - \par Denies SOB cough or wheeze\par No HA/N/V/D [de-identified] : Pre op 5/22 @ [facility] Dr Porter

## 2023-04-26 NOTE — ASSESSMENT
[FreeTextEntry1] : Pre op clearance\par Labs and EKG -\par Inc po fluids\par No change w/tx\par Patient is in optimal care \par No pre op recommendations\par Patient is medically cleared for surgery\par All questions answered\par Re check 3 months

## 2023-04-26 NOTE — ASSESSMENT
[FreeTextEntry1] : Assessment:  \par KEVIN HOLLEY is a 67 year old M with a 1.2 cm obstructing stone cluster in the mid left ureter associated with proximal hydroureter and hydronephrosis, numerous nonobstructing stones in the left kidney, largest 1.3 cm.  Single 5 mm nonobstructing stone in the right kidney.\par \par I discussed the management of urolithiasis with the patient: \par \par Shock Wave Lithotripsy (SWL):  \par This is the least invasive form of surgery for stones and an excellent option for select stones. For ureteral stones, SWL is limited to the upper ureter. I explained how the procedure is performed and the concept behind shock waves. Procedural success is dependent on several stone and environmental factors such as the stone composition or density on CT, the presence or absence of hydronephrosis, size of the stone, stone location, and skin-to-stone distance on CT scan (patient’s body habitus). For these reasons, not all stones/patients are good candidates for SWL. The chances of being stone free after SWL are often much lower compared to other modalities, such as ureteroscopy, except in select cases where stone-free rates are comparable. Therefore, there is a risk that the patient would need subsequent procedures to render them stone-free. Since this is a non-invasive procedure, we are relying on the kidney to spontaneously pass the resultant stone fragments. At the same time, this procedure does carry some perioperative risks, mainly bleeding and infection, as well as the small risk of developing obstruction due to passage of stone fragments, which could require urgent placement of a double-J ureteral stent or nephrostomy tube. \par \par Ureteroscopy:  \par I explained the technique in detail and how it is performed. Complete stone free rates (no residual fragments of any size) approach 90% for ureteral stones and likely range from 50-60% for renal stones. Very commonly, a ureteral stent is left in place at the conclusion of the procedure, but only if needed. I explained that if a stent is placed, it would need to be removed either cystoscopically under local anesthesia or it may have a string left externally through the urethra for removal in a few days after the procedure. Risks of ureteroscopy include, but are not limited to, bleeding, infection, injury to the bladder or ureter, ureteral perforation, ureteral stricture, residual fragments leading to subsequent symptoms or secondary procedures, and other risks involved with general anesthesia. There is also the risk that the procedure needs to be staged into more than one session based on the patient's internal anatomy and the size of the stone(s). Finally, dilation of the ureter and/or ureteral stent placement prior to definitive ureteroscopy may be necessary to achieve ureteral access safely in up to 5% of patients, particularly those who have not been previously instrumented. \par \par Percutaneous nephrolithotomy (PCNL):  \par PCNL is generally reserved for large and/or complex stones, including staghorn calculi, lower pole renal stones > 1 cm, or stones in complex renal anatomy. It has the highest rate of stone clearance but is more invasive. It requires an external access site directly into the kidney through the back in order to remove the stones. I generally perform this surgery in one step in the operating room (the patient will have their kidney access obtained at the same time as the stone removal), thus avoiding the need for multiple procedures (e.g., one procedure to have the kidney access tube placed by interventional radiology and a second procedure in the operating room for the stone removal). Most patients have a ureteral stent following the procedure, which is typically removed in the office at follow up in 1-2 weeks. Alternatively, some patients will be left with a nephrostomy tube (external tube into the kidney). Due to its more invasive nature, PCNL does have higher risks than ureteroscopy and SWL. Specific risks include bleeding requiring transfusion, fevers, sepsis, and major injury to surrounding organs (pleura or bowel). Finally, multiple procedures and/or staged ureteroscopy with laser lithotripsy after PCNL may be required to clear all stone fragments. \par \par I have answered all the patient’s questions and the patient has elected to undergo a ureteroscopy; understands there is a high likelihood of needing a second stage procedure to clear stones given his large stone burden.\par \par \par Plan:  \par -Schedule for left ureteroscopy with laser lithotripsy 5/22/23 -possible second stage will be 1 to 2 weeks afterwards\par -Pre-operative labs\par -UCx next week after completing current course of antibiotics\par -Medical clearance requested \par -Anesthesia alert we will put in for chronic steroid use

## 2023-04-26 NOTE — PHYSICAL EXAM
[General Appearance - Well Developed] : well developed [General Appearance - Well Nourished] : well nourished [Normal Appearance] : normal appearance [Well Groomed] : well groomed [General Appearance - In No Acute Distress] : no acute distress [Costovertebral Angle Tenderness] : no ~M costovertebral angle tenderness

## 2023-05-03 LAB
APPEARANCE: CLEAR
BACTERIA: NEGATIVE /HPF
BILIRUBIN URINE: NEGATIVE
BLOOD URINE: ABNORMAL
CAST: 1 /LPF
COLOR: YELLOW
EPITHELIAL CELLS: 2 /HPF
GLUCOSE QUALITATIVE U: NEGATIVE MG/DL
KETONES URINE: NEGATIVE MG/DL
LEUKOCYTE ESTERASE URINE: ABNORMAL
MICROSCOPIC-UA: NORMAL
NITRITE URINE: NEGATIVE
PH URINE: 6
PROTEIN URINE: NORMAL MG/DL
RED BLOOD CELLS URINE: 12 /HPF
SPECIFIC GRAVITY URINE: 1.02
UROBILINOGEN URINE: 1 MG/DL
WHITE BLOOD CELLS URINE: 57 /HPF

## 2023-05-04 LAB — BACTERIA UR CULT: NORMAL

## 2023-05-18 ENCOUNTER — NON-APPOINTMENT (OUTPATIENT)
Age: 68
End: 2023-05-18

## 2023-05-18 ENCOUNTER — APPOINTMENT (OUTPATIENT)
Dept: UROLOGY | Facility: AMBULATORY SURGERY CENTER | Age: 68
End: 2023-05-18

## 2023-05-19 RX ORDER — ACETAMINOPHEN 500 MG
1000 TABLET ORAL ONCE
Refills: 0 | Status: DISCONTINUED | OUTPATIENT
Start: 2023-05-22 | End: 2023-05-22

## 2023-05-19 NOTE — ASU PATIENT PROFILE, ADULT - NSICDXPASTMEDICALHX_GEN_ALL_CORE_FT
PAST MEDICAL HISTORY:  Adult hypothyroidism     Churg-Michael syndrome     Hypertension     Hypoadrenalism     Renal calculi

## 2023-05-19 NOTE — ASU PATIENT PROFILE, ADULT - NS PREOP UNDERSTANDS INFO
No solid food or milk products after 12 mid-night 5/21/2023/ water, clear apple juice, or clear Gatorade no later than 9am DOS/ photo ID and insurance card/ comfortable clothing/ escort to take you home/yes

## 2023-05-21 ENCOUNTER — TRANSCRIPTION ENCOUNTER (OUTPATIENT)
Age: 68
End: 2023-05-21

## 2023-05-22 ENCOUNTER — TRANSCRIPTION ENCOUNTER (OUTPATIENT)
Age: 68
End: 2023-05-22

## 2023-05-22 ENCOUNTER — OUTPATIENT (OUTPATIENT)
Dept: OUTPATIENT SERVICES | Facility: HOSPITAL | Age: 68
LOS: 1 days | Discharge: HOME | End: 2023-05-22
Payer: MEDICARE

## 2023-05-22 ENCOUNTER — APPOINTMENT (OUTPATIENT)
Dept: UROLOGY | Facility: AMBULATORY SURGERY CENTER | Age: 68
End: 2023-05-22

## 2023-05-22 ENCOUNTER — RESULT REVIEW (OUTPATIENT)
Age: 68
End: 2023-05-22

## 2023-05-22 VITALS
WEIGHT: 145.51 LBS | HEART RATE: 63 BPM | SYSTOLIC BLOOD PRESSURE: 144 MMHG | HEIGHT: 72 IN | RESPIRATION RATE: 16 BRPM | TEMPERATURE: 99 F | DIASTOLIC BLOOD PRESSURE: 76 MMHG | OXYGEN SATURATION: 99 %

## 2023-05-22 VITALS
SYSTOLIC BLOOD PRESSURE: 111 MMHG | TEMPERATURE: 99 F | HEART RATE: 62 BPM | OXYGEN SATURATION: 95 % | RESPIRATION RATE: 16 BRPM | DIASTOLIC BLOOD PRESSURE: 61 MMHG

## 2023-05-22 PROCEDURE — 88300 SURGICAL PATH GROSS: CPT | Mod: 26

## 2023-05-22 PROCEDURE — 52356 CYSTO/URETERO W/LITHOTRIPSY: CPT | Mod: LT

## 2023-05-22 PROCEDURE — 74420 UROGRAPHY RTRGR +-KUB: CPT | Mod: 26

## 2023-05-22 PROCEDURE — 53899 UNLISTED PX URINARY SYSTEM: CPT | Mod: LT

## 2023-05-22 PROCEDURE — 52353 CYSTOURETERO W/LITHOTRIPSY: CPT | Mod: LT,XS

## 2023-05-22 DEVICE — LASER FIBER MOSES 365 D/F/L: Type: IMPLANTABLE DEVICE | Site: LEFT | Status: FUNCTIONAL

## 2023-05-22 DEVICE — GUIDEWIRE SENSOR DUAL-FLEX NITINOL STRAIGHT .035" X 150CM: Type: IMPLANTABLE DEVICE | Site: LEFT | Status: FUNCTIONAL

## 2023-05-22 DEVICE — URETERAL STENT TRIA SOFT 6FR 26MM: Type: IMPLANTABLE DEVICE | Site: LEFT | Status: FUNCTIONAL

## 2023-05-22 DEVICE — STONE BASKET ZEROTIP NITINOL 4-WIRE 1.9FR 120CM X 12MM: Type: IMPLANTABLE DEVICE | Site: LEFT | Status: FUNCTIONAL

## 2023-05-22 DEVICE — CATH STEERABLE IRR ASPIRATION 70CM: Type: IMPLANTABLE DEVICE | Site: LEFT | Status: FUNCTIONAL

## 2023-05-22 DEVICE — LASER FIBER SOLTIVE 200 BALL TIP: Type: IMPLANTABLE DEVICE | Site: LEFT | Status: FUNCTIONAL

## 2023-05-22 DEVICE — GUIDEWIRE SENSOR DUAL-FLEX NITINOL ANGLED .035" X 150CM: Type: IMPLANTABLE DEVICE | Site: LEFT | Status: FUNCTIONAL

## 2023-05-22 DEVICE — URETERAL SHEATH NAVIGATOR HD 12/14FR X 46CM: Type: IMPLANTABLE DEVICE | Site: LEFT | Status: FUNCTIONAL

## 2023-05-22 RX ORDER — SODIUM CHLORIDE 9 MG/ML
500 INJECTION, SOLUTION INTRAVENOUS
Refills: 0 | Status: DISCONTINUED | OUTPATIENT
Start: 2023-05-22 | End: 2023-05-22

## 2023-05-22 RX ORDER — TAMSULOSIN HYDROCHLORIDE 0.4 MG/1
0.4 CAPSULE ORAL ONCE
Refills: 0 | Status: COMPLETED | OUTPATIENT
Start: 2023-05-22 | End: 2024-04-19

## 2023-05-22 RX ORDER — PHENAZOPYRIDINE HCL 100 MG
100 TABLET ORAL ONCE
Refills: 0 | Status: COMPLETED | OUTPATIENT
Start: 2023-05-22 | End: 2023-05-22

## 2023-05-22 RX ORDER — FENTANYL CITRATE 50 UG/ML
25 INJECTION INTRAVENOUS
Refills: 0 | Status: DISCONTINUED | OUTPATIENT
Start: 2023-05-22 | End: 2023-05-22

## 2023-05-22 RX ORDER — MORPHINE SULFATE 50 MG/1
1 CAPSULE, EXTENDED RELEASE ORAL
Refills: 0 | DISCHARGE

## 2023-05-22 RX ORDER — OXYBUTYNIN CHLORIDE 5 MG
5 TABLET ORAL EVERY 8 HOURS
Refills: 0 | Status: DISCONTINUED | OUTPATIENT
Start: 2023-05-22 | End: 2023-05-22

## 2023-05-22 RX ORDER — LEVOTHYROXINE SODIUM 125 MCG
1 TABLET ORAL
Refills: 0 | DISCHARGE

## 2023-05-22 RX ORDER — PHENAZOPYRIDINE HCL 100 MG
100 TABLET ORAL ONCE
Refills: 0 | Status: COMPLETED | OUTPATIENT
Start: 2023-05-22 | End: 2024-04-19

## 2023-05-22 RX ORDER — HYDROCORTISONE 20 MG
1 TABLET ORAL
Refills: 0 | DISCHARGE

## 2023-05-22 RX ORDER — TAMSULOSIN HYDROCHLORIDE 0.4 MG/1
0.4 CAPSULE ORAL ONCE
Refills: 0 | Status: COMPLETED | OUTPATIENT
Start: 2023-05-22 | End: 2023-05-22

## 2023-05-22 RX ORDER — OXYBUTYNIN CHLORIDE 5 MG
5 TABLET ORAL EVERY 8 HOURS
Refills: 0 | Status: COMPLETED | OUTPATIENT
Start: 2023-05-22 | End: 2024-04-19

## 2023-05-22 RX ORDER — GABAPENTIN 400 MG/1
0 CAPSULE ORAL
Refills: 0 | DISCHARGE

## 2023-05-22 RX ORDER — MEPOLIZUMAB 100 MG/ML
100 INJECTION, SOLUTION SUBCUTANEOUS
Refills: 0 | DISCHARGE

## 2023-05-22 RX ORDER — LOSARTAN/HYDROCHLOROTHIAZIDE 100MG-25MG
1 TABLET ORAL
Refills: 0 | DISCHARGE

## 2023-05-22 RX ADMIN — Medication 100 MILLIGRAM(S): at 17:33

## 2023-05-22 RX ADMIN — Medication 5 MILLIGRAM(S): at 17:34

## 2023-05-22 RX ADMIN — TAMSULOSIN HYDROCHLORIDE 0.4 MILLIGRAM(S): 0.4 CAPSULE ORAL at 17:34

## 2023-05-22 NOTE — BRIEF OPERATIVE NOTE - OPERATION/FINDINGS
Left ureteral stone approx. 1.2cm, fragmented and extracted; left lower pole stone measuring 1.4cm, dusted and fragmented as well as 8mm stone in midpole posterior calyx, dusted and fragmented; CVAC suction performed for removal of stone debris, 6F x 26cm ureteral stent placed, on a string

## 2023-05-22 NOTE — ASU DISCHARGE PLAN (ADULT/PEDIATRIC) - CARE PROVIDER_API CALL
Gigi Castañeda)  Urology  [address]  [place], [place] [id number]  Phone: [phone]  Fax: [phone]  Follow Up Time: 2 weeks

## 2023-05-22 NOTE — ASU DISCHARGE PLAN (ADULT/PEDIATRIC) - ASU DC SPECIAL INSTRUCTIONSFT
URETEROSCOPY    GENERAL: It is common to have blood in your urine after your procedure. It may be pink or even red; and it is important to increase fluid intake to 2-3L of water per day to keep the urine as clear as possible. Please inform your doctor if you have a significant amount of clot in the urine or if you are unable to void at all. The urine may clear and then become bloody again especially as you are more physically active.    STENT: You may have an internal stent (a hollow tube that runs from the kidney to your bladder) after your procedure, which helps urine drain from the kidney to your bladder. Some patients experience urinary frequency, burning, or even back pain (especially with urination). These sensations will gradually get better. Increasing your fluid intake can also improve these symptoms. While the stent is in place, your urine may continue to be bloody. This stent is temporary and must be removed by your urologist as an outpatient with in 3 months unless otherwise specified. If your stent is on a string, it is secured to your leg or genitalia with an adhesive bandage. Do not pull on the string, do not remove the bandage, do not insert anything intravaginally/intraurethrally, and do not engage in sexual intercourse until after the stent is removed at your post-operative appointment.    CATHETER: Some patients are sent home with a Karimi catheter, while others go home urinating on their own. A Karimi catheter continuously drains the urine from the bladder. If you still have a catheter, the nurses will review instructions and care before you go home. For men, you may have a prescription for lidocaine jelly to apply to the tip of your penis, as needed, for catheter related discomfort.     UROLOGIC MEDICATIONS:  The following medications may have been sent to your pharmacy for stent related discomfort: Flomax (tamsulosin) 0.4mg at bedtime until stent removed, Ditropan (oxybutynin) 5mg every 8 hours as needed for bladder spasms, and Pyridium (phenazopyridine) 100mg every 8 hours as needed for kidney/bladder discomfort for max 3 days (Pyridium will make your urine orange).    PAIN: You may take Tylenol (acetaminophen) 650-975mg and/or Motrin (ibuprofen) 400-600mg, both available over the counter, for pain every 6 hours as needed. Do not exceed 4000mg of Tylenol (acetaminophen) daily. You may alternate these medications such that you take one or the other every 3 hours for around the clock pain coverage. If you have a stent, the following medications may have been sent to your pharmacy for stent related discomfort: Flomax (tamsulosin) 0.4mg at bedtime until stent removed, Ditropan (oxybutynin) 5mg every 8 hours as needed for bladder spasms, and Pyridium (phenazopyridine) 100mg every 8 hours as needed for kidney/bladder discomfort for max 3 days (Pyridium will make your urine orange).    ANTIBIOTICS: You may be given a prescription for an antibiotic, please take this medication as instructed and be sure to complete the entire course.    STOOL SOFTENERS: Do not allow yourself to become constipated as straining may cause bleeding. Take stool softeners or a laxative (ex. Miralax, Colace, Senokot, ExLax, etc), available over the counter, if needed.    ANTICOAGULATION: If you are taking any blood thinning medications, please discuss with your urologist prior to restarting these medications unless otherwise specified.    BATHING: You may shower or bathe.    DIET: You may resume your regular diet and regular medication regimen.    ACTIVITY: No heavy lifting or strenuous exercise until you are evaluated at your post-operative appointment. Otherwise, you may return to your usual level of physical activity.    FOLLOW-UP: If you did not already schedule your post-operative appointment, please call your urologist to schedule and follow-up appointment.    CALL YOUR UROLOGIST IF: You have any bleeding that does not stop, inability to void >8 hours, fever over 100.4 F, chills, persistent nausea/vomiting, changes in your incision concerning for infection, or if your pain is not controlled on your discharge pain medications.

## 2023-05-22 NOTE — PRE-ANESTHESIA EVALUATION ADULT - NSANTHOSAYNRD_GEN_A_CORE
No. STANLEY screening performed.  STOP BANG Legend: 0-2 = LOW Risk; 3-4 = INTERMEDIATE Risk; 5-8 = HIGH Risk

## 2023-05-23 ENCOUNTER — NON-APPOINTMENT (OUTPATIENT)
Age: 68
End: 2023-05-23

## 2023-05-24 PROBLEM — N20.0 CALCULUS OF KIDNEY: Chronic | Status: ACTIVE | Noted: 2023-05-22

## 2023-05-24 PROBLEM — M30.1: Chronic | Status: ACTIVE | Noted: 2023-05-22

## 2023-05-24 PROBLEM — E27.40 UNSPECIFIED ADRENOCORTICAL INSUFFICIENCY: Chronic | Status: ACTIVE | Noted: 2023-05-22

## 2023-05-24 PROBLEM — E03.9 HYPOTHYROIDISM, UNSPECIFIED: Chronic | Status: ACTIVE | Noted: 2023-05-22

## 2023-05-24 PROBLEM — I10 ESSENTIAL (PRIMARY) HYPERTENSION: Chronic | Status: ACTIVE | Noted: 2023-05-22

## 2023-05-26 ENCOUNTER — APPOINTMENT (OUTPATIENT)
Dept: UROLOGY | Facility: CLINIC | Age: 68
End: 2023-05-26
Payer: MEDICARE

## 2023-05-26 VITALS
TEMPERATURE: 98.7 F | OXYGEN SATURATION: 97 % | SYSTOLIC BLOOD PRESSURE: 174 MMHG | HEART RATE: 84 BPM | DIASTOLIC BLOOD PRESSURE: 72 MMHG

## 2023-05-26 VITALS — SYSTOLIC BLOOD PRESSURE: 132 MMHG | DIASTOLIC BLOOD PRESSURE: 75 MMHG

## 2023-05-26 PROCEDURE — 99213 OFFICE O/P EST LOW 20 MIN: CPT

## 2023-05-26 NOTE — ASSESSMENT
[FreeTextEntry1] : Assessment:  \par KEVIN HOLLEY is a 67 year M who presents s/p left ureteroscopy with laser lithotripsy and stent placement on 5/22/2023. Patient is a first-time/recurrent stone former with risk factors for recurrence.  \par    \par -We reviewed common symptoms after stent removal and advised that they should resolve within the next couple of days. Patient knows to contact the office if they experience any fevers (temp >100.4) or any other concerns.  \par   \par -We discussed generalized stone prevention strategies, metabolic evaluation (serum chemistry profile and 24-hour urine collection +/- PTH testing if serum Ca is elevated), and the natural history of kidney stone disease. I explained that first-time stone formers generally do not need a complete metabolic work-up in the absence of risk factors. However, without behavioral and dietary modification, they are at a heightened risk of developing future symptomatic stones: 10% at 2 years, 20% at 5 years, and 30% at 10 years ([facility] data). In recurrent stone formers, the risk of recurrence is considerably higher with a lifetime recurrence rate of 60-80%. Risk factors include stone type, total stone volume, family history, multiple stones, and many medical comorbidities (DM, HTN, HLD, obesity, gout, HPT). \par  \par Generalized stone prevention counseling was provided as follows: \par   \par 1. High fluid intake. The goal should be to drink enough to produce 2.5 liters (quarts) of urine daily. Most studies have shown that high fluid volume intake will decrease the risk of stone formation. Research generally indicates that there appears to be little difference between hard and soft water in the formation of kidney stones. Lemon juice added to the water may also aid with increasing urine pH, urine citric acid and reducing stone formation.  \par   \par 2. Dietary recommendations. The key to all dietary recommendations is moderation.\par · Decrease animal protein consumption. High protein intake increases urinary calcium, oxalate, and uric acid excretion into the urine - all of which will increase the probability of stone formation. \par · Decrease sodium intake by avoiding heavily salted foods, and resist adding salt to food at the table. Sodium restriction is widely recognized as an important element of dietary prevention of recurrent kidney stones.  \par · Dietary calcium.  Patient should consume a daily recommended allowance of dietary calcium (800-1200 mg). Patients who take in too much Ca or too little Ca are at an increased risk of recurrent stone formation. Dietary calcium is preferable to supplements. Calcium supplementation can be safe when the calcium is taken with meals, rather than at bedtime. Another suggestion for patients who have been recommended to take calcium supplements for their bone health is to consider using calcium citrate, an over-the-counter preparation that provides 950 mg of calcium citrate and 200 mg of elemental calcium in each tablet. \par · Avoid excess intake of foods with high oxalate content, including dark leafy greens, beets, nuts, tea, coffee, and chocolate. Strict avoidance of oxalate is not necessary in most cases. \par · Avoid high doses of vitamin C. Intake should be limited to a maximum daily dose of less than 2 gm (2,000 mg). \par    \par Plan: \par -Follow up in 1 month with renal US in office \par -[employer] ordered\par \par \par Gigi Castañeda MD\par Director, Endourology and the [facility]\par [facility]\par  of Urology\par [facility]  (3) occasionally moist

## 2023-05-26 NOTE — HISTORY OF PRESENT ILLNESS
[FreeTextEntry1] : Name KEVIN HOLLEY \par MRN [id number] \par  Jul 1955 \par ------------------------------------------------------------------------------------------------------------------------------------------- \par Date of First Visit: 23\par Referring Provider/PCP: [facility] / Dr. Izaguirre\par ------------------------------------------------------------------------------------------------------------------------------------------- \par CC: kidney stones \par \par History of Present Illness: KEVIN HOLLEY is a 67 year M who presents for evaluation of kidney stones. He has had recurrent UTI, CT shows 1.2 cm of stone in the mid left ureter, with numerous stones (largest > 1.3 cm) in the left kidney, as well as a 4-5 mm non-obstructing stone in the right kidney. 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 \par \par Imaging: CT scan from 23 can be found in [employer] PACS. Findings: Evaluation of the kidneys demonstrates bilateral renal cysts. There are bilateral renal calculi, greater in number on the left than on the right with a small staghorn calculus the lower pole of the left kidney measuring 1.3 cm. There is moderate left hydroureter secondary to clustered obstructing calculi within the mid left ureter spanning 1.2 cm with surrounding ureteral thickening (image 43, coronal). There is mild edematous infiltration surrounding the left kidney.\par \par \par Previous urine cultures: \par \par Kidney Stone History: \par First-time stone former - yes\par Concurrent asymptomatic stone(s) - yes\par Previous stone surgeries - no\par Comorbidities – non-contributory \par Family history of kidney stones - sister\par Previous metabolic evaluation - no\par ------------------------------------------------------------------------------------------------------------------------------------------- \par Interval History (2023): KEVIN HOLLEY presents s/p left ureteroscopy with laser lithotripsy and stent placement on 2023.  He had a 1.2 cm obstructing stone cluster in the mid left ureter associated with proximal hydroureter and hydronephrosis, numerous nonobstructing stones in the left kidney, largest 1.3 cm. Single 5 mm nonobstructing stone in the right kidney. He tolerated the procedure well and was discharged home on the same day. \par  \par Patient presents today for stent removal and postoperative follow-up.  He feels well. Denies fever, chills, nausea, vomiting. Mild stent-related symptoms - dysuria, bladder discomfort \par  \par Procedure: Stent was left on a string and removed in the office today without difficulty. Stent was intact. Patient tolerated the procedure well.

## 2023-05-31 ENCOUNTER — APPOINTMENT (OUTPATIENT)
Dept: UROLOGY | Facility: CLINIC | Age: 68
End: 2023-05-31

## 2023-06-06 LAB — SURGICAL PATHOLOGY STUDY: SIGNIFICANT CHANGE UP

## 2023-06-08 LAB
CELL MATERIAL STONE EST-MCNT: SIGNIFICANT CHANGE UP
LABORATORY COMMENT REPORT: SIGNIFICANT CHANGE UP
NIDUS STONE QN: SIGNIFICANT CHANGE UP

## 2023-06-27 ENCOUNTER — APPOINTMENT (OUTPATIENT)
Dept: UROLOGY | Facility: CLINIC | Age: 68
End: 2023-06-27

## 2023-07-14 ENCOUNTER — APPOINTMENT (OUTPATIENT)
Dept: UROLOGY | Facility: CLINIC | Age: 68
End: 2023-07-14
Payer: MEDICARE

## 2023-07-14 VITALS
OXYGEN SATURATION: 97 % | HEART RATE: 46 BPM | DIASTOLIC BLOOD PRESSURE: 85 MMHG | BODY MASS INDEX: 20.32 KG/M2 | WEIGHT: 150 LBS | SYSTOLIC BLOOD PRESSURE: 132 MMHG | HEIGHT: 72 IN | TEMPERATURE: 97.8 F

## 2023-07-14 DIAGNOSIS — N20.0 CALCULUS OF KIDNEY: ICD-10-CM

## 2023-07-14 PROCEDURE — 99213 OFFICE O/P EST LOW 20 MIN: CPT

## 2023-07-14 PROCEDURE — 76775 US EXAM ABDO BACK WALL LIM: CPT

## 2023-07-14 NOTE — HISTORY OF PRESENT ILLNESS
[FreeTextEntry1] : Name KEVIN HOLLEY \par MRN [id number] \par  Jul 1955 \par ------------------------------------------------------------------------------------------------------------------------------------------- \par Date of First Visit: 23\par Referring Provider/PCP: [facility] / Dr. Izaguirre\par ------------------------------------------------------------------------------------------------------------------------------------------- \par CC: kidney stones \par \par History of Present Illness: KEVIN HOLLEY is a 67 year M who presents for evaluation of kidney stones. He has had recurrent UTI, CT shows 1.2 cm of stone in the mid left ureter, with numerous stones (largest > 1.3 cm) in the left kidney, as well as a 4-5 mm non-obstructing stone in the right kidney. 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 \par \par Imaging: CT scan from 23 can be found in [employer] PACS. Findings: Evaluation of the kidneys demonstrates bilateral renal cysts. There are bilateral renal calculi, greater in number on the left than on the right with a small staghorn calculus the lower pole of the left kidney measuring 1.3 cm. There is moderate left hydroureter secondary to clustered obstructing calculi within the mid left ureter spanning 1.2 cm with surrounding ureteral thickening (image 43, coronal). There is mild edematous infiltration surrounding the left kidney.\par \par \par Previous urine cultures: \par \par Kidney Stone History: \par First-time stone former - yes\par Concurrent asymptomatic stone(s) - yes\par Previous stone surgeries - no\par Comorbidities – non-contributory \par Family history of kidney stones - sister\par Previous metabolic evaluation - no\par ------------------------------------------------------------------------------------------------------------------------------------------- \par Interval History (2023): KEVIN HOLLEY presents s/p left ureteroscopy with laser lithotripsy and stent placement on 2023. He had a 1.2 cm obstructing stone cluster in the mid left ureter associated with proximal hydroureter and hydronephrosis, numerous nonobstructing stones in the left kidney, largest 1.3 cm. Single 5 mm nonobstructing stone in the right kidney. He tolerated the procedure well and was discharged home on the same day. \par  \par Patient presents today for stent removal and postoperative follow-up. He feels well. Denies fever, chills, nausea, vomiting. Mild stent-related symptoms - dysuria, bladder discomfort \par  \par Procedure: Stent was left on a string and removed in the office today without difficulty. Stent was intact. Patient tolerated the procedure well. \par ------------------------------------------------------------------------------------------------------------------------------------------- \par Interval History (2023): Patient presents for 1-month follow up and renal ultrasound after s/p left ureteroscopy with laser lithotripsy on 2023.  Doing well, no complaints. Ultrasound performed in the office today demonstrates no evidence of hydronephrosis bilaterally.  Known right renal stones measuring 4.7mm in the midpole, 7.5mm and 5.0mm in the lower pole.  On the left side, stone debris measuring 3.3mm and 3.0mm \par \par Stone composition:  60% Calcium oxalate monohydrate. 20% Calcium oxalate dihydrate. 20% Calcium phosphate (apatite).\par Stone composition: 50% Calcium oxalate monohydrate. 30% Calcium phosphate (apatite). 20% Calcium oxalate dihydrate. \par  \par Results of 24-hour urine analysis (completed 2023) demonstrate:  \par -Low urine volume: 1.79 L/day \par -Elevated urinary sodium: 121 mmol/day \par -Normal urinary calcium: 125 mg/day \par -Normal urinary oxalate: 28 mg/day \par -Normal urinary citrate: 588 mg/day \par -PCR: 0.8 mg/kg/day  \par -Normal urinary uric acid: 0.426 g/day \par -Urinary pH: 6.101

## 2023-07-14 NOTE — ASSESSMENT
[FreeTextEntry1] : Assessment:  \par KEVIN HOLLEY is a 67 year M who presents s/p  left ureteroscopy with laser lithotripsy on 5/22/2023. Also has known right  non-obstructing renal stones and left sided stone debris.  \par   \par Reviewed results of recent [facility] 24-hour urinalysis and recommendations that may reduce the risk of stone growth and new stone formation. \par -Increase fluid intake. The goal should be to drink enough to produce 2.5 to 3 liters of urine daily and urine should be clear or very pale yellow. It is often the most controllable risk factor for stone prevention. \par -Decrease dietary sodium intake to no more than 1 Tsp or 2300 mg daily (Target 24HU Na: <1.5 mmol/day; Ideal: <1 mmol/day).  Calcium excretion into the urine is tied closely to the excretion of sodium.  \par -Maintain normal dietary calcium intake between 800-1200 mg of calcium per day.  Oxalate and calcium bind together to leave the body.  A low calcium diet will leave too much oxalate in the body.  \par -Maintain normal dietary oxalate intake. Foods high in oxalate include dark leafy greens, beets, nuts, tea, coffee, and chocolate. The most common types of kidney stones are made of calcium and oxalate. \par -Maintain normal animal protein intake. Purine is a compound found in many animal meats (beef, poultry, pork, fish) which breaks down into uric acid.  High uric acid levels may increase the risk of forming stones.  \par -Maintain adequate dietary fruit intake. Citrate binds to calcium and prevents calcium from binding to oxalate.  This protects you from making more kidney stones.   \par  \par \par Plan:\par -Follow up visit in 6 months with renal ultrasound for surveillance of right non-obstructing stone \par -Continue with generalized stone prevention strategies as previously discussed (increase fluid intake to keep urine clear or very faint yellow, limit dietary salt intake, increase fruits and vegetables, limit high oxalate foods, maintain normal dietary calcium, and moderation of non-dairy animal protein) \par -Proceed with PBx with Dr. Masters\par

## 2023-08-01 ENCOUNTER — NON-APPOINTMENT (OUTPATIENT)
Age: 68
End: 2023-08-01

## 2023-08-01 ENCOUNTER — TRANSCRIPTION ENCOUNTER (OUTPATIENT)
Age: 68
End: 2023-08-01

## 2023-08-03 ENCOUNTER — APPOINTMENT (OUTPATIENT)
Dept: MRI IMAGING | Facility: CLINIC | Age: 68
End: 2023-08-03
Payer: MEDICARE

## 2023-08-03 ENCOUNTER — OUTPATIENT (OUTPATIENT)
Dept: OUTPATIENT SERVICES | Facility: HOSPITAL | Age: 68
LOS: 1 days | End: 2023-08-03

## 2023-08-03 PROCEDURE — 72197 MRI PELVIS W/O & W/DYE: CPT | Mod: 26,MH

## 2023-08-08 ENCOUNTER — TRANSCRIPTION ENCOUNTER (OUTPATIENT)
Age: 68
End: 2023-08-08

## 2023-08-23 ENCOUNTER — APPOINTMENT (OUTPATIENT)
Dept: INTERNAL MEDICINE | Facility: CLINIC | Age: 68
End: 2023-08-23
Payer: MEDICARE

## 2023-08-23 VITALS
HEART RATE: 88 BPM | RESPIRATION RATE: 14 BRPM | SYSTOLIC BLOOD PRESSURE: 138 MMHG | BODY MASS INDEX: 20.13 KG/M2 | HEIGHT: 72 IN | DIASTOLIC BLOOD PRESSURE: 80 MMHG | WEIGHT: 148.6 LBS | TEMPERATURE: 98.3 F | OXYGEN SATURATION: 98 %

## 2023-08-23 PROCEDURE — 94010 BREATHING CAPACITY TEST: CPT

## 2023-08-23 PROCEDURE — 99214 OFFICE O/P EST MOD 30 MIN: CPT | Mod: 25

## 2023-08-23 NOTE — PHYSICAL EXAM
[No Acute Distress] : no acute distress [EOMI] : extraocular movements intact [Normal TMs] : both tympanic membranes were normal [No JVD] : no jugular venous distention [No Lymphadenopathy] : no lymphadenopathy [No Respiratory Distress] : no respiratory distress  [Clear to Auscultation] : lungs were clear to auscultation bilaterally [Normal Rate] : normal rate  [Regular Rhythm] : with a regular rhythm [No Abdominal Bruit] : a ~M bruit was not heard ~T in the abdomen [No Edema] : there was no peripheral edema [Soft] : abdomen soft [Non Tender] : non-tender [Normal Bowel Sounds] : normal bowel sounds [No CVA Tenderness] : no CVA  tenderness [No Joint Swelling] : no joint swelling [No Rash] : no rash [Normal Gait] : normal gait [de-identified] : Mult skin lesions bilat lower ext

## 2023-08-23 NOTE — ASSESSMENT
[FreeTextEntry1] : F/up w/ Dr Porter Has Kidney stones PFT pre - Mild restriction  Jensen labs F/up w/ Dr Song Under care of Derm Mr. Bingham Discussed diet  All questions answered Re check 3 months

## 2023-08-23 NOTE — REVIEW OF SYSTEMS
[Fever] : no fever [Chills] : no chills [Pain] : no pain [Itching] : no itching [Earache] : no earache [Hearing Loss] : no hearing loss [Postnasal Drip] : no postnasal drip [Sore Throat] : no sore throat [Hoarseness] : no hoarseness [Chest Pain] : no chest pain [Palpitations] : no palpitations [Claudication] : no  leg claudication [Shortness Of Breath] : no shortness of breath [Wheezing] : no wheezing [Cough] : no cough [Nausea] : no nausea [Constipation] : no constipation [Diarrhea] : no diarrhea [Vomiting] : no vomiting [Incontinence] : no incontinence [Frequency] : no frequency [Joint Pain] : no joint pain [Skin Rash] : no skin rash [Headache] : no headache [Dizziness] : no dizziness [FreeTextEntry4] : No sense of smell and taste

## 2023-08-23 NOTE — HISTORY OF PRESENT ILLNESS
[FreeTextEntry1] : Post Nepnhrolithiasis and dtill w/ 3 stones on oppos side No blood in Urine pain or fever No cough wheeze CP SOB Palpitations No n/v/d/ha Eating sleep normal Off steroids  Weight stable No additional complaints [de-identified] : Rx Mgmt

## 2023-08-28 ENCOUNTER — APPOINTMENT (OUTPATIENT)
Dept: INTERNAL MEDICINE | Facility: CLINIC | Age: 68
End: 2023-08-28

## 2023-10-02 ENCOUNTER — APPOINTMENT (OUTPATIENT)
Dept: UROLOGY | Facility: CLINIC | Age: 68
End: 2023-10-02
Payer: MEDICARE

## 2023-10-02 VITALS
SYSTOLIC BLOOD PRESSURE: 102 MMHG | WEIGHT: 150 LBS | DIASTOLIC BLOOD PRESSURE: 59 MMHG | BODY MASS INDEX: 20.32 KG/M2 | OXYGEN SATURATION: 98 % | TEMPERATURE: 98.2 F | HEART RATE: 60 BPM | HEIGHT: 72 IN

## 2023-10-02 PROCEDURE — 99214 OFFICE O/P EST MOD 30 MIN: CPT

## 2023-10-03 ENCOUNTER — TRANSCRIPTION ENCOUNTER (OUTPATIENT)
Age: 68
End: 2023-10-03

## 2023-10-03 LAB
APPEARANCE: CLEAR
BACTERIA: NEGATIVE /HPF
BILIRUBIN URINE: NEGATIVE
BLOOD URINE: NEGATIVE
CAST: 0 /LPF
COLOR: YELLOW
EPITHELIAL CELLS: 0 /HPF
GLUCOSE QUALITATIVE U: NEGATIVE MG/DL
KETONES URINE: NEGATIVE MG/DL
LEUKOCYTE ESTERASE URINE: NEGATIVE
MICROSCOPIC-UA: NORMAL
NITRITE URINE: NEGATIVE
PH URINE: 6
PROTEIN URINE: NEGATIVE MG/DL
RED BLOOD CELLS URINE: 1 /HPF
SPECIFIC GRAVITY URINE: 1.01
UROBILINOGEN URINE: 0.2 MG/DL
WHITE BLOOD CELLS URINE: 0 /HPF

## 2023-10-04 LAB — BACTERIA UR CULT: NORMAL

## 2023-10-13 ENCOUNTER — TRANSCRIPTION ENCOUNTER (OUTPATIENT)
Age: 68
End: 2023-10-13

## 2023-10-26 ENCOUNTER — TRANSCRIPTION ENCOUNTER (OUTPATIENT)
Age: 68
End: 2023-10-26

## 2023-10-26 LAB
ANION GAP SERPL CALC-SCNC: 10 MMOL/L
BUN SERPL-MCNC: 15 MG/DL
CALCIUM SERPL-MCNC: 9.9 MG/DL
CHLORIDE SERPL-SCNC: 102 MMOL/L
CO2 SERPL-SCNC: 27 MMOL/L
CREAT SERPL-MCNC: 0.81 MG/DL
EGFR: 96 ML/MIN/1.73M2
GLUCOSE SERPL-MCNC: 121 MG/DL
HCT VFR BLD CALC: 44.3 %
HGB BLD-MCNC: 14.3 G/DL
MCHC RBC-ENTMCNC: 30.3 PG
MCHC RBC-ENTMCNC: 32.3 GM/DL
MCV RBC AUTO: 93.9 FL
PLATELET # BLD AUTO: 301 K/UL
POTASSIUM SERPL-SCNC: 4.7 MMOL/L
RBC # BLD: 4.72 M/UL
RBC # FLD: 11.9 %
SODIUM SERPL-SCNC: 139 MMOL/L
WBC # FLD AUTO: 6 K/UL

## 2023-12-01 LAB
24R-OH-CALCIDIOL SERPL-MCNC: 47.6 PG/ML
ALBUMIN SERPL ELPH-MCNC: 4.2 G/DL
ALP BLD-CCNC: 100 U/L
ALT SERPL-CCNC: 8 U/L
ANION GAP SERPL CALC-SCNC: 10 MMOL/L
APPEARANCE: CLEAR
AST SERPL-CCNC: 17 U/L
BACTERIA: NEGATIVE /HPF
BILIRUB SERPL-MCNC: 0.5 MG/DL
BILIRUBIN URINE: NEGATIVE
BLOOD URINE: NEGATIVE
BUN SERPL-MCNC: 12 MG/DL
CALCIUM SERPL-MCNC: 9.8 MG/DL
CAST: 0 /LPF
CHLORIDE SERPL-SCNC: 102 MMOL/L
CHOLEST SERPL-MCNC: 162 MG/DL
CO2 SERPL-SCNC: 29 MMOL/L
COLOR: YELLOW
CREAT SERPL-MCNC: 0.88 MG/DL
EGFR: 94 ML/MIN/1.73M2
EPITHELIAL CELLS: 0 /HPF
GLUCOSE QUALITATIVE U: NEGATIVE MG/DL
GLUCOSE SERPL-MCNC: 96 MG/DL
HDLC SERPL-MCNC: 60 MG/DL
KETONES URINE: NEGATIVE MG/DL
LDLC SERPL CALC-MCNC: 84 MG/DL
LEUKOCYTE ESTERASE URINE: ABNORMAL
MICROSCOPIC-UA: NORMAL
NITRITE URINE: NEGATIVE
NONHDLC SERPL-MCNC: 102 MG/DL
PH URINE: 6.5
POTASSIUM SERPL-SCNC: 4.5 MMOL/L
PROT SERPL-MCNC: 7.1 G/DL
PROTEIN URINE: NEGATIVE MG/DL
PSA FREE FLD-MCNC: 31 %
PSA FREE SERPL-MCNC: 0.36 NG/ML
PSA SERPL-MCNC: 1.14 NG/ML
RED BLOOD CELLS URINE: 1 /HPF
SODIUM SERPL-SCNC: 141 MMOL/L
SPECIFIC GRAVITY URINE: 1.01
TRIGL SERPL-MCNC: 101 MG/DL
TSH SERPL-ACNC: 1.04 UIU/ML
UROBILINOGEN URINE: 0.2 MG/DL
WHITE BLOOD CELLS URINE: 0 /HPF

## 2023-12-08 ENCOUNTER — APPOINTMENT (OUTPATIENT)
Dept: INTERNAL MEDICINE | Facility: CLINIC | Age: 68
End: 2023-12-08
Payer: MEDICARE

## 2023-12-08 VITALS
TEMPERATURE: 98.4 F | HEIGHT: 72 IN | DIASTOLIC BLOOD PRESSURE: 74 MMHG | HEART RATE: 71 BPM | OXYGEN SATURATION: 97 % | SYSTOLIC BLOOD PRESSURE: 145 MMHG | RESPIRATION RATE: 16 BRPM | WEIGHT: 148.13 LBS | BODY MASS INDEX: 20.06 KG/M2

## 2023-12-08 VITALS — SYSTOLIC BLOOD PRESSURE: 140 MMHG | DIASTOLIC BLOOD PRESSURE: 82 MMHG

## 2023-12-08 DIAGNOSIS — J45.909 UNSPECIFIED ASTHMA, UNCOMPLICATED: ICD-10-CM

## 2023-12-08 PROCEDURE — 94060 EVALUATION OF WHEEZING: CPT

## 2023-12-08 PROCEDURE — 99214 OFFICE O/P EST MOD 30 MIN: CPT | Mod: 25

## 2023-12-08 RX ORDER — TAMSULOSIN HYDROCHLORIDE 0.4 MG/1
0.4 CAPSULE ORAL
Qty: 14 | Refills: 0 | Status: DISCONTINUED | COMMUNITY
Start: 2023-05-18 | End: 2023-12-08

## 2023-12-08 RX ORDER — HYDROCORTISONE 5 MG/1
5 TABLET ORAL
Refills: 0 | Status: DISCONTINUED | COMMUNITY
End: 2023-12-08

## 2023-12-08 RX ORDER — OXYBUTYNIN CHLORIDE 10 MG/1
10 TABLET, EXTENDED RELEASE ORAL
Qty: 14 | Refills: 0 | Status: DISCONTINUED | COMMUNITY
Start: 2023-05-18 | End: 2023-12-08

## 2023-12-08 RX ORDER — PHENAZOPYRIDINE HYDROCHLORIDE 100 MG/1
100 TABLET ORAL 3 TIMES DAILY
Qty: 9 | Refills: 0 | Status: DISCONTINUED | COMMUNITY
Start: 2023-05-18 | End: 2023-12-08

## 2023-12-08 RX ORDER — FLUTICASONE PROPIONATE AND SALMETEROL 250; 50 UG/1; UG/1
250-50 POWDER RESPIRATORY (INHALATION)
Qty: 180 | Refills: 1 | Status: ACTIVE | COMMUNITY
Start: 2023-12-08 | End: 1900-01-01

## 2024-01-12 ENCOUNTER — APPOINTMENT (OUTPATIENT)
Dept: UROLOGY | Facility: CLINIC | Age: 69
End: 2024-01-12
Payer: MEDICARE

## 2024-01-12 VITALS
OXYGEN SATURATION: 96 % | DIASTOLIC BLOOD PRESSURE: 60 MMHG | HEART RATE: 54 BPM | SYSTOLIC BLOOD PRESSURE: 107 MMHG | HEIGHT: 72 IN | WEIGHT: 148 LBS | TEMPERATURE: 97.4 F | BODY MASS INDEX: 20.05 KG/M2

## 2024-01-12 PROCEDURE — 76775 US EXAM ABDO BACK WALL LIM: CPT

## 2024-01-12 PROCEDURE — 99213 OFFICE O/P EST LOW 20 MIN: CPT

## 2024-01-12 NOTE — ADDENDUM
[FreeTextEntry1] : I, Dr. Gigi Castañeda, personally performed the evaluation and management (E/M) services for this established patient with new problem(s)/exacerbation of existing condition(s). That E/M includes conducting the clinically appropriate interval history &/or physical exam, assessing all new/exacerbated conditions, and establishing a new care plan. My NP, Sol Sotomayor, was here to observe my E/M services for this patient.

## 2024-01-12 NOTE — HISTORY OF PRESENT ILLNESS
[FreeTextEntry1] : Name KEVIN HOLLEY MRN [id number]  Jul 1955 ------------------------------------------------------------------------------------------------------------------------------------------- Date of First Visit: 23 Referring Provider/PCP: [facility] / Dr. Izaguirre ------------------------------------------------------------------------------------------------------------------------------------------- CC: kidney stones  History of Present Illness: KEVIN HOLLEY is a 67 year M who presents for evaluation of kidney stones. He has had recurrent UTI, CT shows 1.2 cm of stone in the mid left ureter, with numerous stones (largest > 1.3 cm) in the left kidney, as well as a 4-5 mm non-obstructing stone in the right kidney. PMH notable for Allison-Seymour on long-term hydrocortisone therapy.  Imaging: CT scan from 23 can be found in [employer] PACS. Findings: Evaluation of the kidneys demonstrates bilateral renal cysts. There are bilateral renal calculi, greater in number on the left than on the right with a small staghorn calculus the lower pole of the left kidney measuring 1.3 cm. There is moderate left hydroureter secondary to clustered obstructing calculi within the mid left ureter spanning 1.2 cm with surrounding ureteral thickening (image 43, coronal). There is mild edematous infiltration surrounding the left kidney.  Previous urine cultures:  Kidney Stone History: First-time stone former - yes Concurrent asymptomatic stone(s) - yes Previous stone surgeries - no Comorbidities - non-contributory Family history of kidney stones - sister Previous metabolic evaluation - no ------------------------------------------------------------------------------------------------------------------------------------------- Interval History (2023): KEVIN HOLLEY presents s/p left ureteroscopy with laser lithotripsy and stent placement on 2023. He had a 1.2 cm obstructing stone cluster in the mid left ureter associated with proximal hydroureter and hydronephrosis, numerous nonobstructing stones in the left kidney, largest 1.3 cm. Single 5 mm nonobstructing stone in the right kidney. He tolerated the procedure well and was discharged home on the same day.  Patient presents today for stent removal and postoperative follow-up. He feels well. Denies fever, chills, nausea, vomiting. Mild stent-related symptoms - dysuria, bladder discomfort  Procedure: Stent was left on a string and removed in the office today without difficulty. Stent was intact. Patient tolerated the procedure well. ------------------------------------------------------------------------------------------------------------------------------------------- Interval History (2023): Patient presents for 1-month follow up and renal ultrasound after s/p left ureteroscopy with laser lithotripsy on 2023. Doing well, no complaints. Ultrasound performed in the office today demonstrates no evidence of hydronephrosis bilaterally. Known right renal stones measuring 4.7mm in the midpole, 7.5mm and 5.0mm in the lower pole. On the left side, stone debris measuring 3.3mm and 3.0mm  Stone composition: 60% Calcium oxalate monohydrate. 20% Calcium oxalate dihydrate. 20% Calcium phosphate (apatite). Stone composition: 50% Calcium oxalate monohydrate. 30% Calcium phosphate (apatite). 20% Calcium oxalate dihydrate.  Results of 24-hour urine analysis (completed 2023) demonstrate: -Low urine volume: 1.79 L/day -Elevated urinary sodium: 121 mmol/day -Normal urinary calcium: 125 mg/day -Normal urinary oxalate: 28 mg/day -Normal urinary citrate: 588 mg/day -PCR: 0.8 mg/kg/day -Normal urinary uric acid: 0.426 g/day -Urinary pH: 6.101 ----------------------------------------------------------------------------------------------------------------------------------------------------------------  Interval History (2024): Patient presents for follow-up kidney stone surveillance visit with renal ultrasound. Doing well, no complaints. No recent stone-related events.   Ultrasound performed in the office today demonstrates stable multiple echogenic foci in the right kidney with twinkle artifact and shadowing measuring 4.7 mm in the mid pole and 7.9 mm and 5.2 mm in the lower pole.  Previous stone debris in the treated left lower pole is no longer visualized and left kidney is stone free.  He continues to do his best to drink lemonade/water mixture daily though will go some days at a time without being to drink due to "adrenal symptoms" from chronic prednisone use.

## 2024-01-12 NOTE — HISTORY OF PRESENT ILLNESS
[FreeTextEntry1] : Name KEVIN HOLLEY MRN [id number]  Jul 1955 ------------------------------------------------------------------------------------------------------------------------------------------- Date of First Visit: 23 Referring Provider/PCP: [facility] / Dr. Izaguirre ------------------------------------------------------------------------------------------------------------------------------------------- CC: kidney stones  History of Present Illness: KEVIN HOLLEY is a 67 year M who presents for evaluation of kidney stones. He has had recurrent UTI, CT shows 1.2 cm of stone in the mid left ureter, with numerous stones (largest > 1.3 cm) in the left kidney, as well as a 4-5 mm non-obstructing stone in the right kidney. PMH notable for Allison-Seymour on long-term hydrocortisone therapy.  Imaging: CT scan from 23 can be found in [employer] PACS. Findings: Evaluation of the kidneys demonstrates bilateral renal cysts. There are bilateral renal calculi, greater in number on the left than on the right with a small staghorn calculus the lower pole of the left kidney measuring 1.3 cm. There is moderate left hydroureter secondary to clustered obstructing calculi within the mid left ureter spanning 1.2 cm with surrounding ureteral thickening (image 43, coronal). There is mild edematous infiltration surrounding the left kidney.  Previous urine cultures:  Kidney Stone History: First-time stone former - yes Concurrent asymptomatic stone(s) - yes Previous stone surgeries - no Comorbidities - non-contributory Family history of kidney stones - sister Previous metabolic evaluation - no ------------------------------------------------------------------------------------------------------------------------------------------- Interval History (2023): KEVIN HOLLEY presents s/p left ureteroscopy with laser lithotripsy and stent placement on 2023. He had a 1.2 cm obstructing stone cluster in the mid left ureter associated with proximal hydroureter and hydronephrosis, numerous nonobstructing stones in the left kidney, largest 1.3 cm. Single 5 mm nonobstructing stone in the right kidney. He tolerated the procedure well and was discharged home on the same day.  Patient presents today for stent removal and postoperative follow-up. He feels well. Denies fever, chills, nausea, vomiting. Mild stent-related symptoms - dysuria, bladder discomfort  Procedure: Stent was left on a string and removed in the office today without difficulty. Stent was intact. Patient tolerated the procedure well. ------------------------------------------------------------------------------------------------------------------------------------------- Interval History (2023): Patient presents for 1-month follow up and renal ultrasound after s/p left ureteroscopy with laser lithotripsy on 2023. Doing well, no complaints. Ultrasound performed in the office today demonstrates no evidence of hydronephrosis bilaterally. Known right renal stones measuring 4.7mm in the midpole, 7.5mm and 5.0mm in the lower pole. On the left side, stone debris measuring 3.3mm and 3.0mm  Stone composition: 60% Calcium oxalate monohydrate. 20% Calcium oxalate dihydrate. 20% Calcium phosphate (apatite). Stone composition: 50% Calcium oxalate monohydrate. 30% Calcium phosphate (apatite). 20% Calcium oxalate dihydrate.  Results of 24-hour urine analysis (completed 2023) demonstrate: -Low urine volume: 1.79 L/day -Elevated urinary sodium: 121 mmol/day -Normal urinary calcium: 125 mg/day -Normal urinary oxalate: 28 mg/day -Normal urinary citrate: 588 mg/day -PCR: 0.8 mg/kg/day -Normal urinary uric acid: 0.426 g/day -Urinary pH: 6.101 ----------------------------------------------------------------------------------------------------------------------------------------------------------------  Interval History (2024): Patient presents for follow-up kidney stone surveillance visit with renal ultrasound. Doing well, no complaints. No recent stone-related events.   Ultrasound performed in the office today demonstrates stable multiple echogenic foci in the right kidney with twinkle artifact and shadowing measuring 4.7 mm in the mid pole and 7.9 mm and 5.2 mm in the lower pole.  Previous stone debris in the treated left lower pole is no longer visualized and left kidney is stone free.  He continues to do his best to drink lemonade/water mixture daily though will go some days at a time without being to drink due to "adrenal symptoms" from chronic prednisone use.

## 2024-01-12 NOTE — ASSESSMENT
[FreeTextEntry1] : Assessment:   KEVIN HOLLEY is a 68 year M with a history of kidney stone disease - stable right renal stones. No stones on the left (s/p URS/LL May 2023)     Plan:   -Follow up visit in 1 year for renal ultrasound -Continue with generalized stone prevention strategies as previously discussed (increase fluid intake to keep urine clear or very faint yellow, limit dietary salt intake, increase fruits and vegetables, limit high oxalate foods, maintain normal dietary calcium, and moderation of non-dairy animal protein)

## 2024-03-27 ENCOUNTER — APPOINTMENT (OUTPATIENT)
Dept: INTERNAL MEDICINE | Facility: CLINIC | Age: 69
End: 2024-03-27
Payer: MEDICARE

## 2024-03-27 VITALS
WEIGHT: 148 LBS | DIASTOLIC BLOOD PRESSURE: 68 MMHG | BODY MASS INDEX: 20.05 KG/M2 | TEMPERATURE: 96.6 F | RESPIRATION RATE: 18 BRPM | SYSTOLIC BLOOD PRESSURE: 124 MMHG | HEIGHT: 72 IN | HEART RATE: 65 BPM | OXYGEN SATURATION: 96 %

## 2024-03-27 VITALS — DIASTOLIC BLOOD PRESSURE: 72 MMHG | SYSTOLIC BLOOD PRESSURE: 112 MMHG

## 2024-03-27 DIAGNOSIS — E78.5 HYPERLIPIDEMIA, UNSPECIFIED: ICD-10-CM

## 2024-03-27 DIAGNOSIS — I10 ESSENTIAL (PRIMARY) HYPERTENSION: ICD-10-CM

## 2024-03-27 DIAGNOSIS — M30.1 POLYARTERITIS WITH LUNG INVOLVEMENT [CHURG-STRAUSS]: ICD-10-CM

## 2024-03-27 DIAGNOSIS — D72.18 POLYARTERITIS WITH LUNG INVOLVEMENT [CHURG-STRAUSS]: ICD-10-CM

## 2024-03-27 PROCEDURE — 99213 OFFICE O/P EST LOW 20 MIN: CPT

## 2024-03-27 NOTE — ASSESSMENT
[FreeTextEntry1] : [place] Tuesday [facility] new endocrin eval for hypothyroidism Off steroids since 6/16/23 F/up w/ Dr Song for Churg Michael Renew meds All questions answered Re check 3 months

## 2024-03-27 NOTE — HISTORY OF PRESENT ILLNESS
[FreeTextEntry1] : Felt awful today at 4 pm no appetite and fatigue Has low water consumption No n/v/d/Ha Was great thru 11/1/23  last 4-6 weeks had these similar sessions Saw Dr Causey for thyroid off oral steroids No fever chills Nl urine and bowels [de-identified] : Malorie Rodriguez

## 2024-03-27 NOTE — PHYSICAL EXAM
[No Acute Distress] : no acute distress [Well Developed] : well developed [Normal Sclera/Conjunctiva] : normal sclera/conjunctiva [EOMI] : extraocular movements intact [Normal Outer Ear/Nose] : the outer ears and nose were normal in appearance [Normal TMs] : both tympanic membranes were normal [No JVD] : no jugular venous distention [No Lymphadenopathy] : no lymphadenopathy [No Respiratory Distress] : no respiratory distress  [Supple] : supple [Normal Rate] : normal rate  [Clear to Auscultation] : lungs were clear to auscultation bilaterally [Regular Rhythm] : with a regular rhythm [No Edema] : there was no peripheral edema [Soft] : abdomen soft [Non Tender] : non-tender [Normal Bowel Sounds] : normal bowel sounds [No CVA Tenderness] : no CVA  tenderness [No Joint Swelling] : no joint swelling [No Rash] : no rash [de-identified] : + umb hernia [Normal Gait] : normal gait

## 2024-03-28 ENCOUNTER — TRANSCRIPTION ENCOUNTER (OUTPATIENT)
Age: 69
End: 2024-03-28

## 2024-03-30 LAB
ALBUMIN SERPL ELPH-MCNC: 4.3 G/DL
ALP BLD-CCNC: 92 U/L
ALT SERPL-CCNC: 10 U/L
ANION GAP SERPL CALC-SCNC: 11 MMOL/L
AST SERPL-CCNC: 18 U/L
BASOPHILS # BLD AUTO: 0.08 K/UL
BASOPHILS NFR BLD AUTO: 1.2 %
BILIRUB SERPL-MCNC: 0.5 MG/DL
BUN SERPL-MCNC: 17 MG/DL
CALCIUM SERPL-MCNC: 9.8 MG/DL
CHLORIDE SERPL-SCNC: 101 MMOL/L
CO2 SERPL-SCNC: 29 MMOL/L
CREAT SERPL-MCNC: 0.95 MG/DL
EGFR: 87 ML/MIN/1.73M2
EOSINOPHIL # BLD AUTO: 0.04 K/UL
EOSINOPHIL NFR BLD AUTO: 0.6 %
GLUCOSE SERPL-MCNC: 98 MG/DL
HCT VFR BLD CALC: 45.9 %
HGB BLD-MCNC: 15.2 G/DL
IMM GRANULOCYTES NFR BLD AUTO: 0.1 %
LYMPHOCYTES # BLD AUTO: 1.25 K/UL
LYMPHOCYTES NFR BLD AUTO: 18.6 %
MAN DIFF?: NORMAL
MCHC RBC-ENTMCNC: 30.5 PG
MCHC RBC-ENTMCNC: 33.1 GM/DL
MCV RBC AUTO: 92.2 FL
MONOCYTES # BLD AUTO: 0.57 K/UL
MONOCYTES NFR BLD AUTO: 8.5 %
NEUTROPHILS # BLD AUTO: 4.77 K/UL
NEUTROPHILS NFR BLD AUTO: 71 %
PLATELET # BLD AUTO: 269 K/UL
POTASSIUM SERPL-SCNC: 4.3 MMOL/L
PROT SERPL-MCNC: 7 G/DL
RBC # BLD: 4.98 M/UL
RBC # FLD: 12.2 %
SODIUM SERPL-SCNC: 141 MMOL/L
TSH SERPL-ACNC: 0.39 UIU/ML
WBC # FLD AUTO: 6.72 K/UL

## 2024-04-01 ENCOUNTER — TRANSCRIPTION ENCOUNTER (OUTPATIENT)
Age: 69
End: 2024-04-01

## 2024-04-03 ENCOUNTER — APPOINTMENT (OUTPATIENT)
Dept: ENDOCRINOLOGY | Facility: CLINIC | Age: 69
End: 2024-04-03
Payer: MEDICARE

## 2024-04-03 VITALS
SYSTOLIC BLOOD PRESSURE: 137 MMHG | WEIGHT: 148 LBS | OXYGEN SATURATION: 96 % | TEMPERATURE: 98.9 F | DIASTOLIC BLOOD PRESSURE: 82 MMHG | BODY MASS INDEX: 20.05 KG/M2 | HEIGHT: 72 IN | HEART RATE: 69 BPM

## 2024-04-03 PROCEDURE — 99204 OFFICE O/P NEW MOD 45 MIN: CPT

## 2024-04-03 RX ORDER — BUDESONIDE 0.25 MG/2ML
0.25 INHALANT ORAL
Refills: 0 | Status: ACTIVE | COMMUNITY

## 2024-04-03 NOTE — ASSESSMENT
[FreeTextEntry1] : 68-year-old male with history of Churg Michael (Was on Prednisone for 15 years -->HC for 2 years now off of steroids) here for evaluation of adrenal insufficiency   Adrenal Insufficiency: -Discussed with patient that he will need a full evaluation given that he has been having symptoms of adrenal insufficiency  -Will check am cortisol, ACTH, BMP -Schedule for ACTH stimulation test  -Will follow up results -Advised to wear a medi-alert bracelet -Based on results might consider starting him on HC  Hypothyroidism: -On LT4 88 mcg daily  -Check TSH, Ft4   Follow up in 3 months

## 2024-04-03 NOTE — PHYSICAL EXAM
[Alert] : alert [Well Nourished] : well nourished [No Acute Distress] : no acute distress [Normal Sclera/Conjunctiva] : normal sclera/conjunctiva [EOMI] : extra ocular movement intact [PERRL] : pupils equal, round and reactive to light [Normal Outer Ear/Nose] : the ears and nose were normal in appearance [Normal TMs] : both tympanic membranes were normal [No Neck Mass] : no neck mass was observed [Thyroid Not Enlarged] : the thyroid was not enlarged [No Respiratory Distress] : no respiratory distress [Clear to Auscultation] : lungs were clear to auscultation bilaterally [Normal S1, S2] : normal S1 and S2 [Normal Rate] : heart rate was normal [Regular Rhythm] : with a regular rhythm [Normal Bowel Sounds] : normal bowel sounds [Not Tender] : non-tender [Soft] : abdomen soft [Normal Gait] : normal gait [No Joint Swelling] : no joint swelling seen [No Rash] : no rash [Oriented x3] : oriented to person, place, and time [Normal Reflexes] : deep tendon reflexes were 2+ and symmetric [Normal Affect] : the affect was normal [Normal Insight/Judgement] : insight and judgment were intact

## 2024-04-03 NOTE — HISTORY OF PRESENT ILLNESS
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 Song   Patient has been on Prednisone for 15 years.  Difficulty with taste and smell  Hydrocortisone for 2 years and now off it for the past one year   Hypothyroidism: 3-4 years On levothyroxine 88 mcg   Reported having many days with extreme fatigue and dizziness that can last up to the whole week for the past 4-5 months

## 2024-04-03 NOTE — REVIEW OF SYSTEMS
[Fatigue] : no fatigue [Decreased Appetite] : appetite not decreased [Recent Weight Gain (___ Lbs)] : no recent weight gain [Recent Weight Loss (___ Lbs)] : no recent weight loss [Visual Field Defect] : no visual field defect [Dysphagia] : no dysphagia [Dry Eyes] : no dryness [Dysphonia] : no dysphonia [Chest Pain] : no chest pain [Palpitations] : no palpitations [Shortness Of Breath] : no shortness of breath [Vomiting] : no vomiting [Nausea] : no nausea [Polyuria] : no polyuria [Hesistancy] : no hesitancy [Joint Pain] : no joint pain [Muscle Weakness] : no muscle weakness [Acanthosis] : no acanthosis  [Headaches] : no headaches [Tremors] : no tremors [Depression] : no depression [Polydipsia] : no polydipsia [Cold Intolerance] : no cold intolerance [Easy Bleeding] : no ~M tendency for easy bleeding [Easy Bruising] : no tendency for easy bruising

## 2024-04-08 ENCOUNTER — APPOINTMENT (OUTPATIENT)
Dept: UROLOGY | Facility: CLINIC | Age: 69
End: 2024-04-08

## 2024-04-18 ENCOUNTER — NON-APPOINTMENT (OUTPATIENT)
Age: 69
End: 2024-04-18

## 2024-04-19 ENCOUNTER — APPOINTMENT (OUTPATIENT)
Dept: ENDOCRINOLOGY | Facility: CLINIC | Age: 69
End: 2024-04-19
Payer: MEDICARE

## 2024-04-19 VITALS
HEART RATE: 62 BPM | DIASTOLIC BLOOD PRESSURE: 72 MMHG | SYSTOLIC BLOOD PRESSURE: 127 MMHG | WEIGHT: 148 LBS | BODY MASS INDEX: 20.07 KG/M2

## 2024-04-19 PROCEDURE — 36415 COLL VENOUS BLD VENIPUNCTURE: CPT

## 2024-04-19 PROCEDURE — 96372 THER/PROPH/DIAG INJ SC/IM: CPT

## 2024-04-19 RX ORDER — COSYNTROPIN 0.25 MG/ML
0.25 INJECTION, POWDER, LYOPHILIZED, FOR SOLUTION INTRAVENOUS
Qty: 0 | Refills: 0 | Status: COMPLETED | OUTPATIENT
Start: 2024-04-19

## 2024-04-21 ENCOUNTER — NON-APPOINTMENT (OUTPATIENT)
Age: 69
End: 2024-04-21

## 2024-04-22 LAB
ACTH STIM ACTH BASELINE: 31.9 PG/ML
ACTH STIM CORTISOL 30 MIN: 17.1 UG/DL
ACTH STIM CORTISOL BASELINE: 8 UG/DL
ACTH STIMULATION : CORTISOL 60 MIN: 17.5 UG/DL

## 2024-04-23 RX ORDER — HYDROCORTISONE 10 MG/1
10 TABLET ORAL
Qty: 60 | Refills: 3 | Status: ACTIVE | COMMUNITY
Start: 2024-04-23 | End: 1900-01-01

## 2024-04-23 RX ORDER — HYDROCORTISONE SODIUM SUCCINATE 100 MG/2ML
100 INJECTION, POWDER, FOR SOLUTION INTRAMUSCULAR; INTRAVENOUS ONCE
Qty: 1 | Refills: 4 | Status: ACTIVE | COMMUNITY
Start: 2024-04-23 | End: 1900-01-01

## 2024-04-29 NOTE — ASSESSMENT
[FreeTextEntry1] : 67M w/ an elevated PSA and MRI showing a PIRADS 4 lesion L apex pzpl, however hx complicated by adrenal insufficiency.  The paitent while on ABX PSA dropped to 2.11 while having  UTI 3/9/23 (2022 Labor Day, ?? others).  RTC 6 months  Hx of UTI  - not sexual active - UA/UCx for test of cure  - MRI was done at time of no infx/symptoms  BPH/LUTS - PVR and Flow today- vv not valid. PVR 1cc  Elevated PSA / Bone Lesion - Was falsely elevated confirm elevated - drawn 3/13/23 and confirmed <2.5 ng/ml -- MRI with small lesion - he would like to hold on the biopsy and trend his PSA and repeat MRI in 1 year.    a biopsy was offered.  Thank you very much for allowing me to assist in the care of this patient. Should you have any additional questions or concerns please do not hesitate to contact me.   Sincerely,   Alfredo Masters D.O. Professor of Urology and Radiology  of Urology at [facility] Director for Prostate Cancer [address] [place] [address], [address] [place] [address] Phone: [phone]

## 2024-04-29 NOTE — HISTORY OF PRESENT ILLNESS
[FreeTextEntry1] : Dear MERCEDES Iqbal  Thank you so much for the referral to help care for your patient.  Chief Complaint Biopsy Consult Date of first visit: 03/13/2023  KEVIN HOLLEY is a 67 year old  gentleman who presents for biopsy consult.  He has Churg Michael disease (auto-immune vasculitis).  The patient was treated for a recent UTI and his PSA dropped to 2.11 3/9/23. The patient also has adrenal insufficiency.   The patient's father was diagnosed with CaP.  The patient reports recent trip from [place] (Sept 2023).    His PSA is 2.4 ng/ml. MRI on 1/9/23 demonstrated a PIRADS 4 lesion left apex pzpl. There is a 1.2cm enhancing focus in the right acetabulum which is indeterminate. His PSA density is normal (0.06 ng/ml/cc).   Very minimal urinary symptoms but is not happy he wakes up once at night. He does have frequency but stays well hydrated.  PSA Hx: 2.45   03/13/23  2.1     03/09/23. PSAD 0.06 ng/lm/cc 4.55  10/27/22 (urinary tract infection) 5.64   09/28/22  US Pelvis - 1/9/2023- Prostate volume within normal limits. Probable dystrophic parenchymal calcification. No fluid collections.  CT Hx: 04/24/2023 CT Renal Stone [facility]. Moderate left obstructive uropathy secondary to multiple clustered calculi in the mid to distal left ureter. Bilateral nephrolithiasis.  MRI Hx: MRI read 08/03/2023 at [facility]. 24.3 cc prostate with PIRADS 4 lesion measuring 5.2 mm in the Left lateral PZ at apex. Sigmoid diverticulosis. Mucosal enhancement and submucosal edema of rectum and sigmoid. No LAD No EPE, No Bony Lesions.  The images have been reviewed and clinical implications discussed with the patient. Images reviewed and agree with lesion - +DWI/ADC,  Image#[id number]  MRI at [facility] on 1/09/2023.  31.4 mL prostate with PIRADS 4 lesion measuring 4.2 mm in Left lateral peripheral zone at apex. No LAD No EPE, Bony Lesions : 1.2 cm enhancing focus in the right posterior acetabulum-indeterminate. Correlate with CT and isotope bone scan.  The images have been reviewed and clinical implications discussed with the patient. 04/10/2023 IPSS 4  QOL  5 KVNG 5-NSA Flow/PVR vv not valid. PVR 1 cc  03/13/2023 IPSS 5 QOL 5    PVR - 0cc   The patient denies fevers, chills, nausea and or vomiting and no unexplained weight loss.  All pertinent laboratory, films and physician notes were reviewed.  Questionnaire results were discussed with patient.  Prostate cancer screening: the patient and I spoke at length about prostate cancer screening, its risks and its benefits. The patient has (AGE, Fam hx) 2 risk factors for prostate cancer.  He understands that many men with prostate cancer will die with the disease rather than of it and we also discussed the results large multi-center American and  prostate cancer screening trials. He also understands that PSA in and of itself does not diagnose prostate cancer but only assesses risk to a certain degree. The patient understands that to definitively screen for prostate cancer, a biopsy is required and this procedure has risks, including bleeding, infection, ED and urinary retention. The patient opted to hold on the biopsy.  I spent 31 minutes of non-face to face time reviewing the patient's records, chart, uploading processing MR images, transferring MR images from PACS to an independent workstation, reviewing images on independent workstation, speaking with their physician and planning their prostate biopsy and preparation of an upcoming visit for 10/02/2023 .  The imaging and planning was highly complex and took this entire time.

## 2024-05-01 ENCOUNTER — APPOINTMENT (OUTPATIENT)
Dept: UROLOGY | Facility: CLINIC | Age: 69
End: 2024-05-01
Payer: MEDICARE

## 2024-05-01 VITALS
HEART RATE: 70 BPM | SYSTOLIC BLOOD PRESSURE: 126 MMHG | TEMPERATURE: 98.1 F | OXYGEN SATURATION: 98 % | WEIGHT: 140 LBS | HEIGHT: 72 IN | BODY MASS INDEX: 18.96 KG/M2 | DIASTOLIC BLOOD PRESSURE: 70 MMHG

## 2024-05-01 DIAGNOSIS — R97.20 ELEVATED PROSTATE, SPECIFIC ANTIGEN [PSA]: ICD-10-CM

## 2024-05-01 DIAGNOSIS — R35.0 FREQUENCY OF MICTURITION: ICD-10-CM

## 2024-05-01 PROCEDURE — 99213 OFFICE O/P EST LOW 20 MIN: CPT

## 2024-05-01 NOTE — ADDENDUM
[FreeTextEntry1] :   I, Dr. Masters, personally performed the evaluation and management (E/M) services for this established patient who presents today with (a) new problem(s)/exacerbation of (an) existing condition(s).  That E/M includes conducting the examination, assessing all new/exacerbated conditions, and establishing a new plan of care.  Today, my ACP, Diane Raygoza, ANP-BC, was here to observe my evaluation and management services for this new problem/exacerbated condition to be followed going forward.

## 2024-05-01 NOTE — HISTORY OF PRESENT ILLNESS
[FreeTextEntry1] : Dear MERCEDES Iqbal  Thank you so much for the referral to help care for your patient.  Chief Complaint follow up for elevated psa Date of first visit: 03/13/2023  KEVIN HOLLEY is a 68 year old  gentleman who presents for biopsy consult. He has Churg Michael disease (auto-immune vasculitis). The patient was treated for a recent UTI and his PSA dropped to 2.11 3/9/23. The patient also has adrenal insufficiency. The patient's father was diagnosed with CaP. The patient reports recent trip from [place] (Sept 2023).  His PSA is 2.4 ng/ml. MRI on 1/9/23 demonstrated a PIRADS 4 lesion left apex pzpl. There is a 1.2cm enhancing focus in the right acetabulum which is indeterminate. His PSA density is normal (0.06 ng/ml/cc).  Very minimal urinary symptoms but is not happy he wakes up once at night. He does have frequency but stays well hydrated.  PSA Hx: 1.14 11/30/23 2.45 03/13/23 2.1 03/09/23. PSAD 0.06 ng/lm/cc 4.55 10/27/22 (urinary tract infection) 5.64 09/28/22  US Pelvis - 1/9/2023- Prostate volume within normal limits. Probable dystrophic parenchymal calcification. No fluid collections.  CT Hx: 04/24/2023 CT Renal Stone [facility]. Moderate left obstructive uropathy secondary to multiple clustered calculi in the mid to distal left ureter. Bilateral nephrolithiasis.  MRI Hx: MRI read 08/03/2023 at [facility]. 24.3 cc prostate with PIRADS 4 lesion measuring 5.2 mm in the Left lateral PZ at apex. Sigmoid diverticulosis. Mucosal enhancement and submucosal edema of rectum and sigmoid. No LAD No EPE, No Bony Lesions. The images have been reviewed and clinical implications discussed with the patient. Images reviewed and agree with lesion - +DWI/ADC, Image#[id number]  MRI at [facility] on 1/09/2023. 31.4 mL prostate with PIRADS 4 lesion measuring 4.2 mm in Left lateral peripheral zone at apex. No LAD No EPE, Bony Lesions : 1.2 cm enhancing focus in the right posterior acetabulum-indeterminate. Correlate with CT and isotope bone scan. The images have been reviewed and clinical implications discussed with the patient.  05/01/2023 IPSS  7       QOL   2 KVNG  5  04/10/2023 IPSS 4 QOL 5 KVNG 5-NSA Flow/PVR vv not valid. PVR 1 cc  03/13/2023 IPSS 5 QOL 5 PVR - 0cc  The patient denies fevers, chills, nausea and or vomiting and no unexplained weight loss.  All pertinent laboratory, films and physician notes were reviewed. Questionnaire results were discussed with patient.  Prostate cancer screening: the patient and I spoke at length about prostate cancer screening, its risks and its benefits. The patient has (AGE, Fam hx) 2 risk factors for prostate cancer. He understands that many men with prostate cancer will die with the disease rather than of it and we also discussed the results large multi-center American and  prostate cancer screening trials. He also understands that PSA in and of itself does not diagnose prostate cancer but only assesses risk to a certain degree. The patient understands that to definitively screen for prostate cancer, a biopsy is required and this procedure has risks, including bleeding, infection, ED and urinary retention. The patient opted to hold on the biopsy.  I spent 31 minutes of non-face to face time reviewing the patient's records, chart, uploading processing MR images, transferring MR images from PACS to an independent workstation, reviewing images on independent workstation, speaking with their physician and planning their prostate biopsy and preparation of an upcoming visit for 10/02/2023 . The imaging and planning was highly complex and took this entire time.

## 2024-05-01 NOTE — ASSESSMENT
[FreeTextEntry1] : 68M w/ an elevated PSA and MRI showing a PIRADS 4 lesion L apex pzpl, however hx complicated by adrenal insufficiency. The paitent while on ABX PSA dropped to 2.11 while having UTI 3/9/23 (2022 Labor Day, ?? others). RTC 6 months  Hx of UTI - not sexual active - MRI was done at time of no infx/symptoms  Takes longer to empty bladder  - milk urethra   Elevated PSA / Bone Lesion - Was falsely elevated confirm elevated - drawn 3/13/23 and confirmed <2.5 ng/ml -if + pathology , he will need PSMA PET/CT. If pathology is negative, NM bone scan - MRI with small lesion - he would like to hold on the biopsy and trend his PSA and repeat MRI in 1 year.  a biopsy was offered. - repeat MRI 8/2024 , followed by MRI review with Dr. Masters   Thank you very much for allowing me to assist in the care of this patient. Please do not hesitate to contact me with any additional questions or concerns.      Sincerely,     Alfredo Masters D.O. Professor of Urology and Radiology  of Urology at [facility] Director for Prostate Cancer [address], [address] [facility] Phone: [phone]

## 2024-05-02 ENCOUNTER — TRANSCRIPTION ENCOUNTER (OUTPATIENT)
Age: 69
End: 2024-05-02

## 2024-05-02 LAB — PSA SERPL-MCNC: 1.3 NG/ML

## 2024-05-21 ENCOUNTER — TRANSCRIPTION ENCOUNTER (OUTPATIENT)
Age: 69
End: 2024-05-21

## 2024-05-22 ENCOUNTER — TRANSCRIPTION ENCOUNTER (OUTPATIENT)
Age: 69
End: 2024-05-22

## 2024-07-02 ENCOUNTER — APPOINTMENT (OUTPATIENT)
Dept: ENDOCRINOLOGY | Facility: CLINIC | Age: 69
End: 2024-07-02
Payer: MEDICARE

## 2024-07-02 VITALS
HEIGHT: 72 IN | OXYGEN SATURATION: 97 % | WEIGHT: 150 LBS | HEART RATE: 57 BPM | BODY MASS INDEX: 20.32 KG/M2 | TEMPERATURE: 98.7 F | SYSTOLIC BLOOD PRESSURE: 130 MMHG | DIASTOLIC BLOOD PRESSURE: 72 MMHG

## 2024-07-02 DIAGNOSIS — Z00.00 ENCOUNTER FOR GENERAL ADULT MEDICAL EXAMINATION W/OUT ABNORMAL FINDINGS: ICD-10-CM

## 2024-07-02 DIAGNOSIS — E27.40 UNSPECIFIED ADRENOCORTICAL INSUFFICIENCY: ICD-10-CM

## 2024-07-02 DIAGNOSIS — E03.9 HYPOTHYROIDISM, UNSPECIFIED: ICD-10-CM

## 2024-07-02 PROCEDURE — 99214 OFFICE O/P EST MOD 30 MIN: CPT

## 2024-07-05 LAB
ANION GAP SERPL CALC-SCNC: 12 MMOL/L
BUN SERPL-MCNC: 14 MG/DL
CALCIUM SERPL-MCNC: 9.4 MG/DL
CHLORIDE SERPL-SCNC: 101 MMOL/L
CHOLEST SERPL-MCNC: 160 MG/DL
CO2 SERPL-SCNC: 27 MMOL/L
CREAT SERPL-MCNC: 0.86 MG/DL
EGFR: 94 ML/MIN/1.73M2
ESTIMATED AVERAGE GLUCOSE: 108 MG/DL
GLUCOSE SERPL-MCNC: 113 MG/DL
HBA1C MFR BLD HPLC: 5.4 %
HDLC SERPL-MCNC: 57 MG/DL
LDLC SERPL CALC-MCNC: 86 MG/DL
NONHDLC SERPL-MCNC: 103 MG/DL
POTASSIUM SERPL-SCNC: 4.2 MMOL/L
SODIUM SERPL-SCNC: 140 MMOL/L
T4 FREE SERPL-MCNC: 1.6 NG/DL
TRIGL SERPL-MCNC: 92 MG/DL
TSH SERPL-ACNC: 0.74 UIU/ML

## 2024-07-25 ENCOUNTER — NON-APPOINTMENT (OUTPATIENT)
Age: 69
End: 2024-07-25

## 2024-07-26 ENCOUNTER — APPOINTMENT (OUTPATIENT)
Dept: INTERNAL MEDICINE | Facility: CLINIC | Age: 69
End: 2024-07-26

## 2024-07-26 VITALS
HEIGHT: 72 IN | WEIGHT: 153.38 LBS | OXYGEN SATURATION: 98 % | SYSTOLIC BLOOD PRESSURE: 123 MMHG | HEART RATE: 68 BPM | TEMPERATURE: 97.4 F | BODY MASS INDEX: 20.77 KG/M2 | DIASTOLIC BLOOD PRESSURE: 75 MMHG | RESPIRATION RATE: 18 BRPM

## 2024-07-26 VITALS — DIASTOLIC BLOOD PRESSURE: 84 MMHG | SYSTOLIC BLOOD PRESSURE: 118 MMHG

## 2024-07-26 DIAGNOSIS — D72.18 POLYARTERITIS WITH LUNG INVOLVEMENT [CHURG-STRAUSS]: ICD-10-CM

## 2024-07-26 DIAGNOSIS — R97.20 ELEVATED PROSTATE, SPECIFIC ANTIGEN [PSA]: ICD-10-CM

## 2024-07-26 DIAGNOSIS — I10 ESSENTIAL (PRIMARY) HYPERTENSION: ICD-10-CM

## 2024-07-26 DIAGNOSIS — E27.40 UNSPECIFIED ADRENOCORTICAL INSUFFICIENCY: ICD-10-CM

## 2024-07-26 DIAGNOSIS — M30.1 POLYARTERITIS WITH LUNG INVOLVEMENT [CHURG-STRAUSS]: ICD-10-CM

## 2024-07-26 PROCEDURE — 99214 OFFICE O/P EST MOD 30 MIN: CPT | Mod: 25

## 2024-07-26 PROCEDURE — 94010 BREATHING CAPACITY TEST: CPT

## 2024-07-26 NOTE — HISTORY OF PRESENT ILLNESS
[FreeTextEntry1] : Here for rx mgmt Feeling well - occas sense of smell No fever chills cough wheeze Tolerating Nucala  Eating sleep urine bowels normal No CP SOB palpitations  Gained 3.5 lbs No additional complaints [de-identified] : Rx Mgmt

## 2024-07-26 NOTE — ASSESSMENT
[FreeTextEntry1] : No change w/tx Con't Nucala q weeks PFT pre - wnl All questions answered Re check 3 months

## 2024-07-26 NOTE — REVIEW OF SYSTEMS
[Fever] : no fever [Chills] : no chills [Fatigue] : no fatigue [Discharge] : no discharge [Pain] : no pain [Itching] : no itching [Earache] : no earache [Hearing Loss] : no hearing loss [Nosebleeds] : no nosebleeds [Postnasal Drip] : no postnasal drip [Sore Throat] : no sore throat [Hoarseness] : no hoarseness [Chest Pain] : no chest pain [Palpitations] : no palpitations [Claudication] : no  leg claudication [Lower Ext Edema] : no lower extremity edema [Shortness Of Breath] : no shortness of breath [Wheezing] : no wheezing [Cough] : no cough [Abdominal Pain] : no abdominal pain [Nausea] : no nausea [Constipation] : no constipation [Heartburn] : no heartburn [Dysuria] : no dysuria [Incontinence] : no incontinence [Frequency] : no frequency [Joint Pain] : no joint pain [Itching] : no itching [Skin Rash] : no skin rash [Headache] : no headache [Dizziness] : no dizziness

## 2024-08-01 ENCOUNTER — OUTPATIENT (OUTPATIENT)
Dept: OUTPATIENT SERVICES | Facility: HOSPITAL | Age: 69
LOS: 1 days | End: 2024-08-01

## 2024-08-01 ENCOUNTER — RESULT REVIEW (OUTPATIENT)
Age: 69
End: 2024-08-01

## 2024-08-01 ENCOUNTER — APPOINTMENT (OUTPATIENT)
Dept: MRI IMAGING | Facility: CLINIC | Age: 69
End: 2024-08-01
Payer: MEDICARE

## 2024-08-01 PROCEDURE — 72197 MRI PELVIS W/O & W/DYE: CPT | Mod: 26,MH

## 2024-08-01 PROCEDURE — 76498P: CUSTOM | Mod: 26,MH

## 2024-08-07 ENCOUNTER — APPOINTMENT (OUTPATIENT)
Dept: UROLOGY | Facility: CLINIC | Age: 69
End: 2024-08-07

## 2024-08-07 PROCEDURE — 99214 OFFICE O/P EST MOD 30 MIN: CPT

## 2024-08-07 NOTE — ASSESSMENT
[FreeTextEntry1] : 69M w/ an elevated PSA and MRI showing a PIRADS 4 lesion L apex pzpl, however hx complicated by adrenal insufficiency. The paitent while on ABX PSA dropped to 2.11 while having UTI 3/9/23 (2022 Labor Day, ?? others). 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 I recommended that the patient take this medication at night. If the side effects become too bothersome, the medication can be discontinued.   Elevated PSA / Bone Lesion / Hx of CaP (father) - MRI PIRADS 4 lesion  IRB Notification   The patient has been made aware of the opportunity to participate in our MR US fusion-guided biopsy trial testing the next-generation biopsy technology.  This is an IRB-approved trial (IRB #).  He is already being consented for a standard MR US fusion guided biopsy therefore, there is no change in his clinical workflow.  He has been given a copy of the consent to review before the biopsy date and given an opportunity to contact us with any further questions.  He will be consented on the day of the procedure or electronically before the biopsy.   He understands that many men with prostate cancer will die with the disease rather than of it and we also discussed the results large multi-center American and  prostate cancer screening trials. He also understands that PSA in and of itself does not diagnose prostate cancer but only assesses risk to a certain degree. The patient understands that to definitively screen for prostate cancer, a biopsy is required and this procedure has risks, including bleeding, infection, ED and urinary retention. The patient opted to move forward with the biopsy.   The patient is aware to expect hematuria x 2 weeks and up to 4 weeks of hematospermia.  There is a risk of infection, albeit much lower than a transrectal approach. Patients can sometimes experience erectile dysfunction, but this is usually self-limiting.  For any fever/chills after the biopsy, the patient is to contact the office and go to the ER for an immediate evaluation. He has been given paper instructions outlining these items, including medications to avoid prior to surgery.   1. CBC, BMP, PSA, UA UCx 2. Medical Clearance 3. TP biopsy at [facility] 4. follow up 2 weeks after biopsy with his primary urologist or ourselves. 5. we will call with the path results once they are resulted.   Thank you very much for allowing me to assist in the care of this patient. Please do not hesitate to contact me with any additional questions or concerns.      Sincerely,     Alfredo Masters D.O. Professor of Urology and Radiology  of Urology at [facility] Director for Prostate Cancer [address] [place] [address], [address] [place] [address] Phone: [phone]

## 2024-08-07 NOTE — ASSESSMENT
[FreeTextEntry1] : 69M w/ an elevated PSA and MRI showing a PIRADS 4 lesion L apex pzpl, however hx complicated by adrenal insufficiency. The paitent while on ABX PSA dropped to 2.11 while having UTI 3/9/23 (2022 Labor Day, ?? others). 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 I recommended that the patient take this medication at night. If the side effects become too bothersome, the medication can be discontinued.   Elevated PSA / Bone Lesion / Hx of CaP (father) - MRI PIRADS 4 lesion  IRB Notification   The patient has been made aware of the opportunity to participate in our MR US fusion-guided biopsy trial testing the next-generation biopsy technology.  This is an IRB-approved trial (IRB #).  He is already being consented for a standard MR US fusion guided biopsy therefore, there is no change in his clinical workflow.  He has been given a copy of the consent to review before the biopsy date and given an opportunity to contact us with any further questions.  He will be consented on the day of the procedure or electronically before the biopsy.   He understands that many men with prostate cancer will die with the disease rather than of it and we also discussed the results large multi-center American and  prostate cancer screening trials. He also understands that PSA in and of itself does not diagnose prostate cancer but only assesses risk to a certain degree. The patient understands that to definitively screen for prostate cancer, a biopsy is required and this procedure has risks, including bleeding, infection, ED and urinary retention. The patient opted to move forward with the biopsy.   The patient is aware to expect hematuria x 2 weeks and up to 4 weeks of hematospermia.  There is a risk of infection, albeit much lower than a transrectal approach. Patients can sometimes experience erectile dysfunction, but this is usually self-limiting.  For any fever/chills after the biopsy, the patient is to contact the office and go to the ER for an immediate evaluation. He has been given paper instructions outlining these items, including medications to avoid prior to surgery.   1. CBC, BMP, PSA, UA UCx 2. Medical Clearance 3. TP biopsy at [facility] 4. follow up 2 weeks after biopsy with his primary urologist or ourselves. 5. we will call with the path results once they are resulted.   Thank you very much for allowing me to assist in the care of this patient. Please do not hesitate to contact me with any additional questions or concerns.      Sincerely,     Alfredo Masters D.O. Professor of Urology and Radiology  of Urology at [facility] Director for Prostate Cancer [address] [place] [address], [address] [place] [address] Phone: [phone]

## 2024-08-07 NOTE — PHYSICAL EXAM
[General Appearance - Well Nourished] : well nourished [] : no respiratory distress [Abdomen Soft] : soft [Penis Abnormality] : normal circumcised penis

## 2024-08-07 NOTE — HISTORY OF PRESENT ILLNESS
[FreeTextEntry1] : Dear MERCEDES Iqbal  Thank you so much for the referral to help care for your patient.  Chief Complaint follow up for elevated psa Date of first visit: 03/13/2023  KEVIN HOLLEY is a 69 year old  gentleman who presents for biopsy consult. He has Churg Michael disease (auto-immune vasculitis). The patient was treated for a recent UTI and his PSA dropped to 2.11 3/9/23. The patient also has adrenal insufficiency. The patient's father was diagnosed with CaP. The patient reports recent trip from [place] (Sept 2023).  His PSA is 2.4 ng/ml. MRI on 1/9/23 demonstrated a PIRADS 4 lesion left apex pzpl. There is a 1.2cm enhancing focus in the right acetabulum which is indeterminate. His PSA density is normal (0.06 ng/ml/cc).  Very minimal urinary symptoms but is not happy he wakes up once at night. He does have frequency but stays well hydrated.  PSA Hx: 1.3 5/2/24 1.14 11/30/23 2.45 03/13/23 2.1 03/09/23. PSAD 0.06 ng/lm/cc 4.55 10/27/22 (urinary tract infection) 5.64 09/28/22  US Pelvis - 1/9/2023- Prostate volume within normal limits. Probable dystrophic parenchymal calcification. No fluid collections.  CT Hx: 04/24/2023 CT Renal Stone [facility]. Moderate left obstructive uropathy secondary to multiple clustered calculi in the mid to distal left ureter. Bilateral nephrolithiasis.  MRI Hx:  MRI 8/1/24  LESION: #1 Location: Left anterior PZ apex PI-RADS Assessment Category: 3  LESION: #2 Location: Right anterior PZ at apex. PI-RADS Assessment Category: 3 Bones: 1.6 cm enhancing focus right acetabulum-unchanged as compared to prior study. Prostate Volume: 27.3 mL PSA density: 0.04 ng/mL/mL The images appear to be a 4 given DWI and DCE.    MRI read 08/03/2023 at [facility]. 24.3 cc prostate with PIRADS 4 lesion measuring 5.2 mm in the Left lateral PZ at apex. Sigmoid diverticulosis. Mucosal enhancement and submucosal edema of rectum and sigmoid. No LAD No EPE, No Bony Lesions. The images have been reviewed and clinical implications discussed with the patient.  Images reviewed and agree with lesion - +DWI/ADC, Image#[id number]  MRI at [facility] on 1/09/2023. 31.4 mL prostate with PIRADS 4 lesion measuring 4.2 mm in Left lateral peripheral zone at apex. No LAD No EPE, Bony Lesions : 1.2 cm enhancing focus in the right posterior acetabulum-indeterminate. Correlate with CT and isotope bone scan. The images have been reviewed and clinical implications discussed with the patient.  08/07/2024 IPSS 10      QOL 3 KVNG 4  05/01/2023 IPSS 7 QOL 2 KVNG 5  04/10/2023 IPSS 4 QOL 5 KVNG 5-NSA Flow/PVR vv not valid. PVR 1 cc  03/13/2023 IPSS 5 QOL 5 PVR - 0cc  The patient denies fevers, chills, nausea and or vomiting and no unexplained weight loss.  All pertinent laboratory, films and physician notes were reviewed. Questionnaire results were discussed with patient.  Prostate cancer screening: the patient and I spoke at length about prostate cancer screening, its risks and its benefits. The patient has (AGE, Fam hx) 2 risk factors for prostate cancer. He understands that many men with prostate cancer will die with the disease rather than of it and we also discussed the results large multi-center American and  prostate cancer screening trials. He also understands that PSA in and of itself does not diagnose prostate cancer but only assesses risk to a certain degree. The patient understands that to definitively screen for prostate cancer, a biopsy is required and this procedure has risks, including bleeding, infection, ED and urinary retention. The patient opted to hold on the biopsy.  I spent 31 minutes of non-face-to-face time reviewing the patient's records chart, uploading processing MR images, transferring MR images from PACS to an independent workstation, reviewing images on an independent workstation, speaking with their physician and planning their prostate biopsy and preparation of an upcoming visit for 08/07/2024 .  The imaging and planning was highly complex and took this entire time.

## 2024-08-07 NOTE — HISTORY OF PRESENT ILLNESS
[FreeTextEntry1] : Dear MERCEDES Iqbal  Thank you so much for the referral to help care for your patient.  Chief Complaint follow up for elevated psa Date of first visit: 03/13/2023  KEVIN HOLLEY is a 69 year old  gentleman who presents for biopsy consult. He has Churg Michael disease (auto-immune vasculitis). The patient was treated for a recent UTI and his PSA dropped to 2.11 3/9/23. The patient also has adrenal insufficiency. The patient's father was diagnosed with CaP. The patient reports recent trip from [place] (Sept 2023).  His PSA is 2.4 ng/ml. MRI on 1/9/23 demonstrated a PIRADS 4 lesion left apex pzpl. There is a 1.2cm enhancing focus in the right acetabulum which is indeterminate. His PSA density is normal (0.06 ng/ml/cc).  Very minimal urinary symptoms but is not happy he wakes up once at night. He does have frequency but stays well hydrated.  PSA Hx: 1.3 5/2/24 1.14 11/30/23 2.45 03/13/23 2.1 03/09/23. PSAD 0.06 ng/lm/cc 4.55 10/27/22 (urinary tract infection) 5.64 09/28/22  US Pelvis - 1/9/2023- Prostate volume within normal limits. Probable dystrophic parenchymal calcification. No fluid collections.  CT Hx: 04/24/2023 CT Renal [facility]. Moderate left obstructive uropathy secondary to multiple clustered calculi in the mid to distal left ureter. Bilateral nephrolithiasis.  MRI Hx:  MRI 8/1/24  LESION: #1 Location: Left anterior PZ apex PI-RADS Assessment Category: 3  LESION: #2 Location: Right anterior PZ at apex. PI-RADS Assessment Category: 3 Bones: 1.6 cm enhancing focus right acetabulum-unchanged as compared to prior study. Prostate Volume: 27.3 mL PSA density: 0.04 ng/mL/mL The images appear to be a 4 given DWI and DCE.    MRI read 08/03/2023 at [facility]. 24.3 cc prostate with PIRADS 4 lesion measuring 5.2 mm in the Left lateral PZ at apex. Sigmoid diverticulosis. Mucosal enhancement and submucosal edema of rectum and sigmoid. No LAD No EPE, No Bony Lesions. The images have been reviewed and clinical implications discussed with the patient.  Images reviewed and agree with lesion - +DWI/ADC, Image#[id number]  MRI at [facility] on 1/09/2023. 31.4 mL prostate with PIRADS 4 lesion measuring 4.2 mm in Left lateral peripheral zone at apex. No LAD No EPE, Bony Lesions : 1.2 cm enhancing focus in the right posterior acetabulum-indeterminate. Correlate with CT and isotope bone scan. The images have been reviewed and clinical implications discussed with the patient.  08/07/2024 IPSS 10      QOL 3 KVNG 4  05/01/2023 IPSS 7 QOL 2 KVNG 5  04/10/2023 IPSS 4 QOL 5 KVNG 5-NSA Flow/PVR vv not valid. PVR 1 cc  03/13/2023 IPSS 5 QOL 5 PVR - 0cc  The patient denies fevers, chills, nausea and or vomiting and no unexplained weight loss.  All pertinent laboratory, films and physician notes were reviewed. Questionnaire results were discussed with patient.  Prostate cancer screening: the patient and I spoke at length about prostate cancer screening, its risks and its benefits. The patient has (AGE, Fam hx) 2 risk factors for prostate cancer. He understands that many men with prostate cancer will die with the disease rather than of it and we also discussed the results large multi-center American and  prostate cancer screening trials. He also understands that PSA in and of itself does not diagnose prostate cancer but only assesses risk to a certain degree. The patient understands that to definitively screen for prostate cancer, a biopsy is required and this procedure has risks, including bleeding, infection, ED and urinary retention. The patient opted to hold on the biopsy.  I spent 31 minutes of non-face-to-face time reviewing the patient's records chart, uploading processing MR images, transferring MR images from PACS to an independent workstation, reviewing images on an independent workstation, speaking with their physician and planning their prostate biopsy and preparation of an upcoming visit for 08/07/2024 .  The imaging and planning was highly complex and took this entire time.

## 2024-08-07 NOTE — HISTORY OF PRESENT ILLNESS
[FreeTextEntry1] : Dear MERCEDES Iqbal  Thank you so much for the referral to help care for your patient.  Chief Complaint follow up for elevated psa Date of first visit: 03/13/2023  KEVIN HOLLEY is a 69 year old  gentleman who presents for biopsy consult. He has Churg Michael disease (auto-immune vasculitis). The patient was treated for a recent UTI and his PSA dropped to 2.11 3/9/23. The patient also has adrenal insufficiency. The patient's father was diagnosed with CaP. The patient reports recent trip from [place] (Sept 2023).  His PSA is 2.4 ng/ml. MRI on 1/9/23 demonstrated a PIRADS 4 lesion left apex pzpl. There is a 1.2cm enhancing focus in the right acetabulum which is indeterminate. His PSA density is normal (0.06 ng/ml/cc).  Very minimal urinary symptoms but is not happy he wakes up once at night. He does have frequency but stays well hydrated.  PSA Hx: 1.3 5/2/24 1.14 11/30/23 2.45 03/13/23 2.1 03/09/23. PSAD 0.06 ng/lm/cc 4.55 10/27/22 (urinary tract infection) 5.64 09/28/22  US Pelvis - 1/9/2023- Prostate volume within normal limits. Probable dystrophic parenchymal calcification. No fluid collections.  CT Hx: 04/24/2023 CT Renal [facility]. Moderate left obstructive uropathy secondary to multiple clustered calculi in the mid to distal left ureter. Bilateral nephrolithiasis.  MRI Hx:  MRI 8/1/24  LESION: #1 Location: Left anterior PZ apex PI-RADS Assessment Category: 3  LESION: #2 Location: Right anterior PZ at apex. PI-RADS Assessment Category: 3 Bones: 1.6 cm enhancing focus right acetabulum-unchanged as compared to prior study. Prostate Volume: 27.3 mL PSA density: 0.04 ng/mL/mL The images appear to be a 4 given DWI and DCE.    MRI read 08/03/2023 at [facility]. 24.3 cc prostate with PIRADS 4 lesion measuring 5.2 mm in the Left lateral PZ at apex. Sigmoid diverticulosis. Mucosal enhancement and submucosal edema of rectum and sigmoid. No LAD No EPE, No Bony Lesions. The images have been reviewed and clinical implications discussed with the patient.  Images reviewed and agree with lesion - +DWI/ADC, Image#[id number]  MRI at [facility] on 1/09/2023. 31.4 mL prostate with PIRADS 4 lesion measuring 4.2 mm in Left lateral peripheral zone at apex. No LAD No EPE, Bony Lesions : 1.2 cm enhancing focus in the right posterior acetabulum-indeterminate. Correlate with CT and isotope bone scan. The images have been reviewed and clinical implications discussed with the patient.  08/07/2024 IPSS 10      QOL 3 KVNG 4  05/01/2023 IPSS 7 QOL 2 KVNG 5  04/10/2023 IPSS 4 QOL 5 KVNG 5-NSA Flow/PVR vv not valid. PVR 1 cc  03/13/2023 IPSS 5 QOL 5 PVR - 0cc  The patient denies fevers, chills, nausea and or vomiting and no unexplained weight loss.  All pertinent laboratory, films and physician notes were reviewed. Questionnaire results were discussed with patient.  Prostate cancer screening: the patient and I spoke at length about prostate cancer screening, its risks and its benefits. The patient has (AGE, Fam hx) 2 risk factors for prostate cancer. He understands that many men with prostate cancer will die with the disease rather than of it and we also discussed the results large multi-center American and  prostate cancer screening trials. He also understands that PSA in and of itself does not diagnose prostate cancer but only assesses risk to a certain degree. The patient understands that to definitively screen for prostate cancer, a biopsy is required and this procedure has risks, including bleeding, infection, ED and urinary retention. The patient opted to hold on the biopsy.  I spent 31 minutes of non-face-to-face time reviewing the patient's records chart, uploading processing MR images, transferring MR images from PACS to an independent workstation, reviewing images on an independent workstation, speaking with their physician and planning their prostate biopsy and preparation of an upcoming visit for 08/07/2024 .  The imaging and planning was highly complex and took this entire time.

## 2024-08-07 NOTE — ASSESSMENT
[FreeTextEntry1] : 69M w/ an elevated PSA and MRI showing a PIRADS 4 lesion L apex pzpl, however hx complicated by adrenal insufficiency. The paitent while on ABX PSA dropped to 2.11 while having UTI 3/9/23 (2022 Labor Day, ?? others). 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 I recommended that the patient take this medication at night. If the side effects become too bothersome, the medication can be discontinued.   Elevated PSA / Bone Lesion / Hx of CaP (father) - MRI PIRADS 4 lesion  IRB Notification   The patient has been made aware of the opportunity to participate in our MR US fusion-guided biopsy trial testing the next-generation biopsy technology.  This is an IRB-approved trial (IRB #).  He is already being consented for a standard MR US fusion guided biopsy therefore, there is no change in his clinical workflow.  He has been given a copy of the consent to review before the biopsy date and given an opportunity to contact us with any further questions.  He will be consented on the day of the procedure or electronically before the biopsy.   He understands that many men with prostate cancer will die with the disease rather than of it and we also discussed the results large multi-center American and  prostate cancer screening trials. He also understands that PSA in and of itself does not diagnose prostate cancer but only assesses risk to a certain degree. The patient understands that to definitively screen for prostate cancer, a biopsy is required and this procedure has risks, including bleeding, infection, ED and urinary retention. The patient opted to move forward with the biopsy.   The patient is aware to expect hematuria x 2 weeks and up to 4 weeks of hematospermia.  There is a risk of infection, albeit much lower than a transrectal approach. Patients can sometimes experience erectile dysfunction, but this is usually self-limiting.  For any fever/chills after the biopsy, the patient is to contact the office and go to the ER for an immediate evaluation. He has been given paper instructions outlining these items, including medications to avoid prior to surgery.   1. CBC, BMP, PSA, UA UCx 2. Medical Clearance 3. TP biopsy at [facility] 4. follow up 2 weeks after biopsy with his primary urologist or ourselves. 5. we will call with the path results once they are resulted.   Thank you very much for allowing me to assist in the care of this patient. Please do not hesitate to contact me with any additional questions or concerns.      Sincerely,     Alfredo Masters D.O. Professor of Urology and Radiology  of Urology at [facility] Director for Prostate Cancer [address] [place] [address], [address] [place] [address] Phone: [phone]

## 2024-08-19 ENCOUNTER — TRANSCRIPTION ENCOUNTER (OUTPATIENT)
Age: 69
End: 2024-08-19

## 2024-08-20 ENCOUNTER — TRANSCRIPTION ENCOUNTER (OUTPATIENT)
Age: 69
End: 2024-08-20

## 2024-10-01 ENCOUNTER — NON-APPOINTMENT (OUTPATIENT)
Age: 69
End: 2024-10-01

## 2024-10-02 ENCOUNTER — LABORATORY RESULT (OUTPATIENT)
Age: 69
End: 2024-10-02

## 2024-10-02 ENCOUNTER — APPOINTMENT (OUTPATIENT)
Dept: INTERNAL MEDICINE | Facility: CLINIC | Age: 69
End: 2024-10-02
Payer: MEDICARE

## 2024-10-02 VITALS
TEMPERATURE: 97.3 F | DIASTOLIC BLOOD PRESSURE: 70 MMHG | HEIGHT: 72 IN | HEART RATE: 63 BPM | SYSTOLIC BLOOD PRESSURE: 119 MMHG | WEIGHT: 153.4 LBS | RESPIRATION RATE: 18 BRPM | OXYGEN SATURATION: 95 % | BODY MASS INDEX: 20.78 KG/M2

## 2024-10-02 DIAGNOSIS — Z00.8 ENCOUNTER FOR OTHER GENERAL EXAMINATION: ICD-10-CM

## 2024-10-02 DIAGNOSIS — Z01.818 ENCOUNTER FOR OTHER PREPROCEDURAL EXAMINATION: ICD-10-CM

## 2024-10-02 PROCEDURE — 99204 OFFICE O/P NEW MOD 45 MIN: CPT

## 2024-10-02 PROCEDURE — G0405: CPT

## 2024-10-02 NOTE — PHYSICAL EXAM
[Normal Sclera/Conjunctiva] : normal sclera/conjunctiva [EOMI] : extraocular movements intact [No Varicosities] : no varicosities [No Edema] : there was no peripheral edema [Normal Posterior Cervical Nodes] : no posterior cervical lymphadenopathy [Normal Anterior Cervical Nodes] : no anterior cervical lymphadenopathy [Normal] : normal gait, coordination grossly intact, no focal deficits and deep tendon reflexes were 2+ and symmetric [Normal Affect] : the affect was normal [Normal Insight/Judgement] : insight and judgment were intact

## 2024-10-02 NOTE — REVIEW OF SYSTEMS
[Dysuria] : dysuria [Hesitancy] : hesitancy [Frequency] : frequency [Negative] : Heme/Lymph [Incontinence] : no incontinence [Nocturia] : no nocturia [Hematuria] : no hematuria [Impotence] : no impotency [Poor Libido] : libido not poor

## 2024-10-02 NOTE — HISTORY OF PRESENT ILLNESS
[No Pertinent Cardiac History] : no history of aortic stenosis, atrial fibrillation, coronary artery disease, recent myocardial infarction, or implantable device/pacemaker [Asthma] : asthma [Good (7-10 METs)] : Good (7-10 METs) [COPD] : no COPD [Sleep Apnea] : no sleep apnea [Smoker] : not a smoker [Family Member] : no family member with adverse anesthesia reaction/sudden death [Self] : no previous adverse anesthesia reaction [Chronic Anticoagulation] : no chronic anticoagulation [Chronic Kidney Disease] : no chronic kidney disease [Diabetes] : no diabetes [FreeTextEntry1] : prostate biopsy [FreeTextEntry2] : 10/8 [FreeTextEntry3] : Dr. Mims [FreeTextEntry4] : 68 y/o pmh gurdeepgg jim, htn presents for pre-op. He complains of LUTS, including pain, dribbling. These symptoms are improved with flomax.

## 2024-10-03 LAB
APPEARANCE: CLEAR
BILIRUBIN URINE: NEGATIVE
BLOOD URINE: NEGATIVE
COLOR: NORMAL
GLUCOSE QUALITATIVE U: NEGATIVE MG/DL
KETONES URINE: ABNORMAL MG/DL
LEUKOCYTE ESTERASE URINE: ABNORMAL
NITRITE URINE: NEGATIVE
PH URINE: 6
PROTEIN URINE: NORMAL MG/DL
SPECIFIC GRAVITY URINE: 1.02
UROBILINOGEN URINE: 1 MG/DL

## 2024-10-04 ENCOUNTER — OUTPATIENT (OUTPATIENT)
Dept: OUTPATIENT SERVICES | Facility: HOSPITAL | Age: 69
LOS: 1 days | End: 2024-10-04
Payer: MEDICARE

## 2024-10-04 DIAGNOSIS — R97.20 ELEVATED PROSTATE SPECIFIC ANTIGEN [PSA]: ICD-10-CM

## 2024-10-04 LAB — BACTERIA UR CULT: NORMAL

## 2024-10-08 ENCOUNTER — NON-APPOINTMENT (OUTPATIENT)
Age: 69
End: 2024-10-08

## 2024-10-09 ENCOUNTER — NON-APPOINTMENT (OUTPATIENT)
Age: 69
End: 2024-10-09

## 2024-10-09 NOTE — ASU PATIENT PROFILE, ADULT - NSICDXPASTSURGICALHX_GEN_ALL_CORE_FT
PAST SURGICAL HISTORY:  History of lithotripsy      PAST SURGICAL HISTORY:  H/O colonoscopy     History of lithotripsy

## 2024-10-09 NOTE — ASU PATIENT PROFILE, ADULT - NS PREOP UNDERSTANDS INFO
No solid food/dairy/candy/gum after midnight tonight; water allowed before 08:30am tomorrow; patient reminded to come with photo ID/insurance/credit card; dress in comfortable clothes; no jewelries/contact lens/valuable; no smoking/alcohol drinking/recreational drug use today; escort to have photo ID; address and callback number was given/yes

## 2024-10-09 NOTE — ASU PATIENT PROFILE, ADULT - VISION (WITH CORRECTIVE LENSES IF THE PATIENT USUALLY WEARS THEM):
glasses for distance/Partially impaired: cannot see medication labels or newsprint, but can see obstacles in path, and the surrounding layout; can count fingers at arm's length Normal vision: sees adequately in most situations; can see medication labels, newsprint

## 2024-10-10 ENCOUNTER — OUTPATIENT (OUTPATIENT)
Dept: OUTPATIENT SERVICES | Facility: HOSPITAL | Age: 69
LOS: 1 days | Discharge: HOME | End: 2024-10-10
Payer: MEDICARE

## 2024-10-10 ENCOUNTER — TRANSCRIPTION ENCOUNTER (OUTPATIENT)
Age: 69
End: 2024-10-10

## 2024-10-10 ENCOUNTER — APPOINTMENT (OUTPATIENT)
Dept: UROLOGY | Facility: AMBULATORY SURGERY CENTER | Age: 69
End: 2024-10-10

## 2024-10-10 ENCOUNTER — RESULT REVIEW (OUTPATIENT)
Age: 69
End: 2024-10-10

## 2024-10-10 VITALS
WEIGHT: 146.61 LBS | TEMPERATURE: 98 F | DIASTOLIC BLOOD PRESSURE: 72 MMHG | RESPIRATION RATE: 16 BRPM | HEIGHT: 72 IN | HEART RATE: 63 BPM | SYSTOLIC BLOOD PRESSURE: 126 MMHG | OXYGEN SATURATION: 97 %

## 2024-10-10 VITALS
OXYGEN SATURATION: 96 % | SYSTOLIC BLOOD PRESSURE: 108 MMHG | TEMPERATURE: 98 F | HEART RATE: 68 BPM | DIASTOLIC BLOOD PRESSURE: 69 MMHG | RESPIRATION RATE: 16 BRPM

## 2024-10-10 DIAGNOSIS — Z98.890 OTHER SPECIFIED POSTPROCEDURAL STATES: Chronic | ICD-10-CM

## 2024-10-10 PROCEDURE — 55706 BX PRST8 NDL SAT SAMPLING: CPT

## 2024-10-10 PROCEDURE — 88344 IMHCHEM/IMCYTCHM EA MLT ANTB: CPT | Mod: 26

## 2024-10-10 PROCEDURE — 76999F: CUSTOM | Mod: 26

## 2024-10-10 PROCEDURE — G0416: CPT | Mod: 26

## 2024-10-10 RX ORDER — FENTANYL CITRATE-0.9 % NACL/PF 300MCG/30
25 PATIENT CONTROLLED ANALGESIA VIAL INJECTION
Refills: 0 | Status: DISCONTINUED | OUTPATIENT
Start: 2024-10-10 | End: 2024-10-10

## 2024-10-10 RX ORDER — FLUTICASONE PROPION/SALMETEROL 100-50 MCG
1 BLISTER, WITH INHALATION DEVICE INHALATION
Refills: 0 | DISCHARGE

## 2024-10-10 RX ORDER — TAMSULOSIN HCL 0.4 MG
1 CAPSULE ORAL
Refills: 0 | DISCHARGE

## 2024-10-10 RX ORDER — OXYCODONE HYDROCHLORIDE 30 MG/1
5 TABLET, FILM COATED, EXTENDED RELEASE ORAL ONCE
Refills: 0 | Status: DISCONTINUED | OUTPATIENT
Start: 2024-10-10 | End: 2024-10-10

## 2024-10-10 RX ORDER — SODIUM CHLORIDE IRRIG SOLUTION 0.9 %
500 SOLUTION, IRRIGATION IRRIGATION
Refills: 0 | Status: DISCONTINUED | OUTPATIENT
Start: 2024-10-10 | End: 2024-10-10

## 2024-10-10 RX ORDER — ONDANSETRON HCL/PF 4 MG/2 ML
4 VIAL (ML) INJECTION ONCE
Refills: 0 | Status: DISCONTINUED | OUTPATIENT
Start: 2024-10-10 | End: 2024-10-10

## 2024-10-10 RX ORDER — LIDOCAINE HCL 20 MG/ML
10 AMPUL (ML) INJECTION ONCE
Refills: 0 | Status: COMPLETED | OUTPATIENT
Start: 2024-10-10 | End: 2024-10-10

## 2024-10-10 NOTE — ASU DISCHARGE PLAN (ADULT/PEDIATRIC) - ASU DC SPECIAL INSTRUCTIONSFT
PROSTATE BIOPSY Transperineal fusion prostate biopsy    GENERAL: Expect blood in the urine, stool, and ejaculate for up to two (2) months postoperatively. This is normal and to be expected after this procedure. Increase hydration to keep the urine as clear as possible.    SURGICAL WOUND: There are often lumps and bumps that can be felt in the perineum (skin between scrotum and anus) for up to two (2) months or more post operatively. These are of no concern and with time they will soften and disappear.  Any “black and blue” bruising areas will also resolve.  You may apply an ice-pack for 15 minutes out of every hour for the first 24 -36 hours to minimize pain and swelling. You may apply over the counter Bacitracin to the wound several times a day, and keep covered with over the counter gauze as necessary.    PAIN: You may have some intermittent pain for up to six (6) weeks post operatively. Pain does not signify any problem unless associated with fever, chills, or inability to void.  If you experience any fevers or chills please call immediately as this may be signs of an infection. You may take Tylenol (acetaminophen) 650-975mg and/or Motrin (ibuprofen) 400-600mg, both available over the counter, for pain every 6 hours as needed. Do not exceed 4000mg of Tylenol (acetaminophen) daily. You may alternate these medications such that you take one or the other every 3 hours for around the clock pain coverage.     ANTICOAGULATION: If you are taking any blood thinning medications, please discuss with your urologist prior to restarting these medications unless otherwise specified.    BATHING: You may shower with soap and water, and pat dry the needle insertion sites in the perineum. Avoid sitting in water for prolonged periods of time for the next few days.    DIET: You may resume your regular diet and regular medication regimen.    ACTIVITY: No heavy lifting or strenuous exercise until you are evaluated at your post-operative appointment. Otherwise, you may return to your usual level of physical activity.    FOLLOW-UP: If you did not already schedule your post-operative appointment, please call your urologist to schedule and follow-up appointment.    CALL YOUR UROLOGIST IF: You have any bleeding that does not stop, inability to void >8 hours, fever over 100.4 F, chills, persistent nausea/vomiting, changes in your incision concerning for infection, or if your pain is not controlled on your discharge pain medications. PROSTATE BIOPSY Transperineal fusion prostate biopsy    If you go home with a catheter, call Dr. Masters's office in the morning to make an appointment for a trial of void. LIkely on Monday.    GENERAL: Expect blood in the urine, stool, and ejaculate for up to two (2) months postoperatively. This is normal and to be expected after this procedure. Increase hydration to keep the urine as clear as possible.    SURGICAL WOUND: There are often lumps and bumps that can be felt in the perineum (skin between scrotum and anus) for up to two (2) months or more post operatively. These are of no concern and with time they will soften and disappear.  Any “black and blue” bruising areas will also resolve.  You may apply an ice-pack for 15 minutes out of every hour for the first 24 -36 hours to minimize pain and swelling. You may apply over the counter Bacitracin to the wound several times a day, and keep covered with over the counter gauze as necessary.    PAIN: You may have some intermittent pain for up to six (6) weeks post operatively. Pain does not signify any problem unless associated with fever, chills, or inability to void.  If you experience any fevers or chills please call immediately as this may be signs of an infection. You may take Tylenol (acetaminophen) 650-975mg and/or Motrin (ibuprofen) 400-600mg, both available over the counter, for pain every 6 hours as needed. Do not exceed 4000mg of Tylenol (acetaminophen) daily. You may alternate these medications such that you take one or the other every 3 hours for around the clock pain coverage.     ANTICOAGULATION: If you are taking any blood thinning medications, please discuss with your urologist prior to restarting these medications unless otherwise specified.    BATHING: You may shower with soap and water, and pat dry the needle insertion sites in the perineum. Avoid sitting in water for prolonged periods of time for the next few days.    DIET: You may resume your regular diet and regular medication regimen.    ACTIVITY: No heavy lifting or strenuous exercise until you are evaluated at your post-operative appointment. Otherwise, you may return to your usual level of physical activity.    FOLLOW-UP: If you did not already schedule your post-operative appointment, please call your urologist to schedule and follow-up appointment.    CALL YOUR UROLOGIST IF: You have any bleeding that does not stop, inability to void >8 hours, fever over 100.4 F, chills, persistent nausea/vomiting, changes in your incision concerning for infection, or if your pain is not controlled on your discharge pain medications.

## 2024-10-10 NOTE — ASU DISCHARGE PLAN (ADULT/PEDIATRIC) - CARE PROVIDER_API CALL
Emili Masters  Urology  [address] [place] [address]  Phone: [phone]  Fax: [phone]  Follow Up Time:

## 2024-10-10 NOTE — BRIEF OPERATIVE NOTE - NSICDXBRIEFPROCEDURE_GEN_ALL_CORE_FT
PROCEDURES:  Transperineal template-guided mapping biopsy of prostate 10-Oct-2024 09:57:16  Dana Escamilla

## 2024-10-10 NOTE — ASU PREOP CHECKLIST - BP NONINVASIVE DIASTOLIC (MM HG)
Spoke with Pat. She is having problem with a backed up drain and is waiting for repairman and does not want to be on the phone. She asked writer to call her back next week. Scheduled for 4/19 in the afternoon.   
72

## 2024-10-11 ENCOUNTER — NON-APPOINTMENT (OUTPATIENT)
Age: 69
End: 2024-10-11

## 2024-10-17 ENCOUNTER — NON-APPOINTMENT (OUTPATIENT)
Age: 69
End: 2024-10-17

## 2024-10-17 DIAGNOSIS — C61 MALIGNANT NEOPLASM OF PROSTATE: ICD-10-CM

## 2024-10-24 ENCOUNTER — RX RENEWAL (OUTPATIENT)
Age: 69
End: 2024-10-24

## 2024-10-28 ENCOUNTER — APPOINTMENT (OUTPATIENT)
Dept: UROLOGY | Facility: CLINIC | Age: 69
End: 2024-10-28
Payer: MEDICARE

## 2024-10-28 ENCOUNTER — NON-APPOINTMENT (OUTPATIENT)
Age: 69
End: 2024-10-28

## 2024-10-28 VITALS
SYSTOLIC BLOOD PRESSURE: 136 MMHG | HEIGHT: 72 IN | DIASTOLIC BLOOD PRESSURE: 74 MMHG | WEIGHT: 151.25 LBS | TEMPERATURE: 98.2 F | OXYGEN SATURATION: 97 % | HEART RATE: 79 BPM | BODY MASS INDEX: 20.49 KG/M2

## 2024-10-28 DIAGNOSIS — R97.20 ELEVATED PROSTATE, SPECIFIC ANTIGEN [PSA]: ICD-10-CM

## 2024-10-28 DIAGNOSIS — C61 MALIGNANT NEOPLASM OF PROSTATE: ICD-10-CM

## 2024-10-28 PROCEDURE — 99214 OFFICE O/P EST MOD 30 MIN: CPT

## 2024-11-04 PROBLEM — R35.0 FREQUENCY OF MICTURITION: Chronic | Status: ACTIVE | Noted: 2024-10-10

## 2024-11-04 PROBLEM — J45.909 UNSPECIFIED ASTHMA, UNCOMPLICATED: Chronic | Status: ACTIVE | Noted: 2024-10-10

## 2024-11-04 PROBLEM — G57.90 UNSPECIFIED MONONEUROPATHY OF UNSPECIFIED LOWER LIMB: Chronic | Status: ACTIVE | Noted: 2024-10-10

## 2024-11-07 ENCOUNTER — TRANSCRIPTION ENCOUNTER (OUTPATIENT)
Age: 69
End: 2024-11-07

## 2024-11-07 ENCOUNTER — OUTPATIENT (OUTPATIENT)
Dept: OUTPATIENT SERVICES | Facility: HOSPITAL | Age: 69
LOS: 1 days | End: 2024-11-07
Payer: MEDICARE

## 2024-11-07 ENCOUNTER — APPOINTMENT (OUTPATIENT)
Dept: NUCLEAR MEDICINE | Facility: HOSPITAL | Age: 69
End: 2024-11-07

## 2024-11-07 DIAGNOSIS — Z98.890 OTHER SPECIFIED POSTPROCEDURAL STATES: Chronic | ICD-10-CM

## 2024-11-07 PROCEDURE — 78816 PET IMAGE W/CT FULL BODY: CPT | Mod: 26,MH

## 2024-11-13 ENCOUNTER — NON-APPOINTMENT (OUTPATIENT)
Age: 69
End: 2024-11-13

## 2024-11-13 ENCOUNTER — TRANSCRIPTION ENCOUNTER (OUTPATIENT)
Age: 69
End: 2024-11-13

## 2024-11-18 ENCOUNTER — APPOINTMENT (OUTPATIENT)
Dept: INTERNAL MEDICINE | Facility: CLINIC | Age: 69
End: 2024-11-18
Payer: MEDICARE

## 2024-11-18 VITALS
BODY MASS INDEX: 20.45 KG/M2 | HEIGHT: 72 IN | OXYGEN SATURATION: 95 % | TEMPERATURE: 98.2 F | DIASTOLIC BLOOD PRESSURE: 67 MMHG | SYSTOLIC BLOOD PRESSURE: 115 MMHG | RESPIRATION RATE: 18 BRPM | WEIGHT: 151 LBS | HEART RATE: 76 BPM

## 2024-11-18 VITALS — SYSTOLIC BLOOD PRESSURE: 122 MMHG | DIASTOLIC BLOOD PRESSURE: 72 MMHG

## 2024-11-18 DIAGNOSIS — D72.18 POLYARTERITIS WITH LUNG INVOLVEMENT [CHURG-STRAUSS]: ICD-10-CM

## 2024-11-18 DIAGNOSIS — J45.909 UNSPECIFIED ASTHMA, UNCOMPLICATED: ICD-10-CM

## 2024-11-18 DIAGNOSIS — M30.1 POLYARTERITIS WITH LUNG INVOLVEMENT [CHURG-STRAUSS]: ICD-10-CM

## 2024-11-18 DIAGNOSIS — C61 MALIGNANT NEOPLASM OF PROSTATE: ICD-10-CM

## 2024-11-18 PROCEDURE — 99213 OFFICE O/P EST LOW 20 MIN: CPT

## 2024-11-20 ENCOUNTER — TRANSCRIPTION ENCOUNTER (OUTPATIENT)
Age: 69
End: 2024-11-20

## 2024-11-20 PROCEDURE — A9595: CPT

## 2024-11-20 PROCEDURE — C8001: CPT

## 2024-11-20 PROCEDURE — 78816 PET IMAGE W/CT FULL BODY: CPT

## 2025-01-06 ENCOUNTER — NON-APPOINTMENT (OUTPATIENT)
Age: 70
End: 2025-01-06

## 2025-01-06 ENCOUNTER — APPOINTMENT (OUTPATIENT)
Dept: UROLOGY | Facility: CLINIC | Age: 70
End: 2025-01-06
Payer: MEDICARE

## 2025-01-06 VITALS
BODY MASS INDEX: 20.59 KG/M2 | WEIGHT: 152 LBS | DIASTOLIC BLOOD PRESSURE: 65 MMHG | OXYGEN SATURATION: 95 % | SYSTOLIC BLOOD PRESSURE: 133 MMHG | HEART RATE: 64 BPM | HEIGHT: 72 IN | TEMPERATURE: 98.7 F

## 2025-01-06 DIAGNOSIS — R97.20 ELEVATED PROSTATE, SPECIFIC ANTIGEN [PSA]: ICD-10-CM

## 2025-01-06 DIAGNOSIS — C61 MALIGNANT NEOPLASM OF PROSTATE: ICD-10-CM

## 2025-01-06 PROCEDURE — 99214 OFFICE O/P EST MOD 30 MIN: CPT

## 2025-01-07 ENCOUNTER — TRANSCRIPTION ENCOUNTER (OUTPATIENT)
Age: 70
End: 2025-01-07

## 2025-01-07 ENCOUNTER — APPOINTMENT (OUTPATIENT)
Dept: ENDOCRINOLOGY | Facility: CLINIC | Age: 70
End: 2025-01-07
Payer: MEDICARE

## 2025-01-07 VITALS
BODY MASS INDEX: 20.72 KG/M2 | SYSTOLIC BLOOD PRESSURE: 132 MMHG | WEIGHT: 153 LBS | OXYGEN SATURATION: 99 % | DIASTOLIC BLOOD PRESSURE: 74 MMHG | HEIGHT: 72 IN | HEART RATE: 65 BPM | TEMPERATURE: 98 F

## 2025-01-07 DIAGNOSIS — E27.40 UNSPECIFIED ADRENOCORTICAL INSUFFICIENCY: ICD-10-CM

## 2025-01-07 DIAGNOSIS — E03.9 HYPOTHYROIDISM, UNSPECIFIED: ICD-10-CM

## 2025-01-07 LAB
PSA FREE FLD-MCNC: 23 %
PSA FREE SERPL-MCNC: 0.4 NG/ML
PSA SERPL-MCNC: 1.73 NG/ML

## 2025-01-07 PROCEDURE — 99214 OFFICE O/P EST MOD 30 MIN: CPT

## 2025-01-07 PROCEDURE — G2211 COMPLEX E/M VISIT ADD ON: CPT

## 2025-01-08 ENCOUNTER — APPOINTMENT (OUTPATIENT)
Dept: UROLOGY | Facility: CLINIC | Age: 70
End: 2025-01-08
Payer: MEDICARE

## 2025-01-08 ENCOUNTER — APPOINTMENT (OUTPATIENT)
Dept: UROLOGY | Facility: CLINIC | Age: 70
End: 2025-01-08

## 2025-01-08 VITALS
TEMPERATURE: 98.7 F | HEART RATE: 59 BPM | SYSTOLIC BLOOD PRESSURE: 153 MMHG | OXYGEN SATURATION: 96 % | DIASTOLIC BLOOD PRESSURE: 88 MMHG | BODY MASS INDEX: 20.72 KG/M2 | WEIGHT: 153 LBS | HEIGHT: 72 IN

## 2025-01-08 DIAGNOSIS — N20.0 CALCULUS OF KIDNEY: ICD-10-CM

## 2025-01-08 PROCEDURE — G2211 COMPLEX E/M VISIT ADD ON: CPT

## 2025-01-08 PROCEDURE — 76775 US EXAM ABDO BACK WALL LIM: CPT

## 2025-01-08 PROCEDURE — 99214 OFFICE O/P EST MOD 30 MIN: CPT

## 2025-01-10 ENCOUNTER — TRANSCRIPTION ENCOUNTER (OUTPATIENT)
Age: 70
End: 2025-01-10

## 2025-01-10 LAB
ANION GAP SERPL CALC-SCNC: 15 MMOL/L
BUN SERPL-MCNC: 13 MG/DL
CALCIUM SERPL-MCNC: 9.8 MG/DL
CHLORIDE SERPL-SCNC: 102 MMOL/L
CO2 SERPL-SCNC: 24 MMOL/L
CORTISOL: 9.2 UG/DL
CREAT SERPL-MCNC: 0.81 MG/DL
EGFR: 95 ML/MIN/1.73M2
GLUCOSE SERPL-MCNC: 80 MG/DL
POTASSIUM SERPL-SCNC: 3.8 MMOL/L
SODIUM SERPL-SCNC: 141 MMOL/L
T4 FREE SERPL-MCNC: 1.6 NG/DL
TSH SERPL-ACNC: 1.24 UIU/ML

## 2025-01-13 ENCOUNTER — OUTPATIENT (OUTPATIENT)
Dept: OUTPATIENT SERVICES | Facility: HOSPITAL | Age: 70
LOS: 1 days | End: 2025-01-13

## 2025-01-13 ENCOUNTER — APPOINTMENT (OUTPATIENT)
Dept: CT IMAGING | Facility: CLINIC | Age: 70
End: 2025-01-13
Payer: MEDICARE

## 2025-01-13 DIAGNOSIS — Z98.890 OTHER SPECIFIED POSTPROCEDURAL STATES: Chronic | ICD-10-CM

## 2025-01-13 PROCEDURE — 74176 CT ABD & PELVIS W/O CONTRAST: CPT | Mod: 26

## 2025-01-14 ENCOUNTER — NON-APPOINTMENT (OUTPATIENT)
Age: 70
End: 2025-01-14

## 2025-03-17 ENCOUNTER — APPOINTMENT (OUTPATIENT)
Dept: INTERNAL MEDICINE | Facility: CLINIC | Age: 70
End: 2025-03-17
Payer: MEDICARE

## 2025-03-17 VITALS
OXYGEN SATURATION: 98 % | RESPIRATION RATE: 18 BRPM | HEIGHT: 72 IN | WEIGHT: 153 LBS | TEMPERATURE: 97.6 F | BODY MASS INDEX: 20.72 KG/M2 | HEART RATE: 82 BPM

## 2025-03-17 DIAGNOSIS — L08.9 OTHER INJURY OF UNSPECIFIED BODY REGION, INITIAL ENCOUNTER: ICD-10-CM

## 2025-03-17 DIAGNOSIS — T14.8XXA OTHER INJURY OF UNSPECIFIED BODY REGION, INITIAL ENCOUNTER: ICD-10-CM

## 2025-03-17 PROCEDURE — 99214 OFFICE O/P EST MOD 30 MIN: CPT

## 2025-03-17 PROCEDURE — 15853 REMOVAL SUTR/STAPL XREQ ANES: CPT

## 2025-03-17 RX ORDER — CEPHALEXIN 500 MG/1
500 CAPSULE ORAL
Qty: 14 | Refills: 0 | Status: ACTIVE | COMMUNITY
Start: 2025-03-17 | End: 1900-01-01

## 2025-03-18 ENCOUNTER — TRANSCRIPTION ENCOUNTER (OUTPATIENT)
Age: 70
End: 2025-03-18

## 2025-04-02 ENCOUNTER — APPOINTMENT (OUTPATIENT)
Dept: INTERNAL MEDICINE | Facility: CLINIC | Age: 70
End: 2025-04-02
Payer: MEDICARE

## 2025-04-02 VITALS
WEIGHT: 153 LBS | DIASTOLIC BLOOD PRESSURE: 73 MMHG | TEMPERATURE: 98 F | HEART RATE: 77 BPM | OXYGEN SATURATION: 96 % | RESPIRATION RATE: 18 BRPM | SYSTOLIC BLOOD PRESSURE: 125 MMHG | BODY MASS INDEX: 20.75 KG/M2

## 2025-04-02 VITALS — SYSTOLIC BLOOD PRESSURE: 106 MMHG | DIASTOLIC BLOOD PRESSURE: 70 MMHG

## 2025-04-02 DIAGNOSIS — E78.5 HYPERLIPIDEMIA, UNSPECIFIED: ICD-10-CM

## 2025-04-02 DIAGNOSIS — I10 ESSENTIAL (PRIMARY) HYPERTENSION: ICD-10-CM

## 2025-04-02 DIAGNOSIS — M30.1 POLYARTERITIS WITH LUNG INVOLVEMENT [CHURG-STRAUSS]: ICD-10-CM

## 2025-04-02 DIAGNOSIS — D72.18 POLYARTERITIS WITH LUNG INVOLVEMENT [CHURG-STRAUSS]: ICD-10-CM

## 2025-04-02 DIAGNOSIS — E27.40 UNSPECIFIED ADRENOCORTICAL INSUFFICIENCY: ICD-10-CM

## 2025-04-02 PROCEDURE — 93000 ELECTROCARDIOGRAM COMPLETE: CPT

## 2025-04-02 PROCEDURE — 99214 OFFICE O/P EST MOD 30 MIN: CPT | Mod: 25

## 2025-04-08 LAB
ALBUMIN SERPL ELPH-MCNC: 4.2 G/DL
ALP BLD-CCNC: 97 U/L
ALT SERPL-CCNC: 10 U/L
ANION GAP SERPL CALC-SCNC: 14 MMOL/L
APPEARANCE: CLEAR
AST SERPL-CCNC: 14 U/L
BACTERIA: NEGATIVE /HPF
BILIRUB SERPL-MCNC: 0.6 MG/DL
BILIRUBIN URINE: NEGATIVE
BLOOD URINE: NEGATIVE
BUN SERPL-MCNC: 16 MG/DL
CALCIUM SERPL-MCNC: 9.9 MG/DL
CAST: 0 /LPF
CHLORIDE SERPL-SCNC: 102 MMOL/L
CHOLEST SERPL-MCNC: 178 MG/DL
CO2 SERPL-SCNC: 26 MMOL/L
COLOR: NORMAL
CREAT SERPL-MCNC: 0.9 MG/DL
EGFRCR SERPLBLD CKD-EPI 2021: 92 ML/MIN/1.73M2
EPITHELIAL CELLS: 1 /HPF
GLUCOSE QUALITATIVE U: NEGATIVE MG/DL
GLUCOSE SERPL-MCNC: 83 MG/DL
HCT VFR BLD CALC: 47.7 %
HDLC SERPL-MCNC: 55 MG/DL
HGB BLD-MCNC: 14.9 G/DL
KETONES URINE: ABNORMAL MG/DL
LDLC SERPL-MCNC: 104 MG/DL
LEUKOCYTE ESTERASE URINE: ABNORMAL
MCHC RBC-ENTMCNC: 30 PG
MCHC RBC-ENTMCNC: 31.2 G/DL
MCV RBC AUTO: 96 FL
MICROSCOPIC-UA: NORMAL
NITRITE URINE: NEGATIVE
NONHDLC SERPL-MCNC: 123 MG/DL
PH URINE: 5.5
PLATELET # BLD AUTO: 278 K/UL
POTASSIUM SERPL-SCNC: 4.3 MMOL/L
PROT SERPL-MCNC: 7.2 G/DL
PROTEIN URINE: NORMAL MG/DL
PSA FREE FLD-MCNC: 25 %
PSA FREE SERPL-MCNC: 0.47 NG/ML
PSA SERPL-MCNC: 1.84 NG/ML
RBC # BLD: 4.97 M/UL
RBC # FLD: 12.6 %
RED BLOOD CELLS URINE: NORMAL /HPF
REVIEW: NORMAL
SODIUM SERPL-SCNC: 142 MMOL/L
SPECIFIC GRAVITY URINE: 1.02
TRIGL SERPL-MCNC: 108 MG/DL
UROBILINOGEN URINE: 1 MG/DL
WBC # FLD AUTO: 5.98 K/UL
WHITE BLOOD CELLS URINE: 1 /HPF

## 2025-04-23 ENCOUNTER — RX RENEWAL (OUTPATIENT)
Age: 70
End: 2025-04-23

## 2025-04-29 ENCOUNTER — RX RENEWAL (OUTPATIENT)
Age: 70
End: 2025-04-29

## 2025-05-01 NOTE — BRIEF OPERATIVE NOTE - NSICDXBRIEFPREOP_GEN_ALL_CORE_FT
PRE-OP DIAGNOSIS:  Left nephrolithiasis 22-May-2023 16:41:37  Lloyd France   REQUIRED- Click to run Sepsis Recognition Calculator

## 2025-07-02 ENCOUNTER — APPOINTMENT (OUTPATIENT)
Dept: UROLOGY | Facility: CLINIC | Age: 70
End: 2025-07-02
Payer: MEDICARE

## 2025-07-02 ENCOUNTER — APPOINTMENT (OUTPATIENT)
Dept: ENDOCRINOLOGY | Facility: CLINIC | Age: 70
End: 2025-07-02
Payer: MEDICARE

## 2025-07-02 VITALS
TEMPERATURE: 98.4 F | HEART RATE: 59 BPM | SYSTOLIC BLOOD PRESSURE: 117 MMHG | OXYGEN SATURATION: 95 % | HEIGHT: 72 IN | DIASTOLIC BLOOD PRESSURE: 74 MMHG | WEIGHT: 153 LBS | BODY MASS INDEX: 20.72 KG/M2

## 2025-07-02 VITALS
OXYGEN SATURATION: 97 % | DIASTOLIC BLOOD PRESSURE: 78 MMHG | HEART RATE: 74 BPM | SYSTOLIC BLOOD PRESSURE: 123 MMHG | BODY MASS INDEX: 20.86 KG/M2 | TEMPERATURE: 98.8 F | HEIGHT: 72 IN | WEIGHT: 154 LBS

## 2025-07-02 PROCEDURE — G2211 COMPLEX E/M VISIT ADD ON: CPT

## 2025-07-02 PROCEDURE — 99214 OFFICE O/P EST MOD 30 MIN: CPT

## 2025-07-10 ENCOUNTER — TRANSCRIPTION ENCOUNTER (OUTPATIENT)
Age: 70
End: 2025-07-10

## 2025-07-10 LAB
ANION GAP SERPL CALC-SCNC: 16 MMOL/L
BUN SERPL-MCNC: 14 MG/DL
CALCIUM SERPL-MCNC: 9.7 MG/DL
CHLORIDE SERPL-SCNC: 103 MMOL/L
CO2 SERPL-SCNC: 22 MMOL/L
CREAT SERPL-MCNC: 0.93 MG/DL
EGFRCR SERPLBLD CKD-EPI 2021: 89 ML/MIN/1.73M2
GLUCOSE SERPL-MCNC: 121 MG/DL
POTASSIUM SERPL-SCNC: 4.5 MMOL/L
SODIUM SERPL-SCNC: 141 MMOL/L
T4 FREE SERPL-MCNC: 1.9 NG/DL
TSH SERPL-ACNC: 0.99 UIU/ML

## 2025-08-04 ENCOUNTER — NON-APPOINTMENT (OUTPATIENT)
Age: 70
End: 2025-08-04

## 2025-08-12 ENCOUNTER — APPOINTMENT (OUTPATIENT)
Dept: MRI IMAGING | Facility: CLINIC | Age: 70
End: 2025-08-12
Payer: MEDICARE

## 2025-08-12 ENCOUNTER — OUTPATIENT (OUTPATIENT)
Dept: OUTPATIENT SERVICES | Facility: HOSPITAL | Age: 70
LOS: 1 days | End: 2025-08-12

## 2025-08-12 ENCOUNTER — RESULT REVIEW (OUTPATIENT)
Age: 70
End: 2025-08-12

## 2025-08-12 DIAGNOSIS — Z98.890 OTHER SPECIFIED POSTPROCEDURAL STATES: Chronic | ICD-10-CM

## 2025-08-12 PROCEDURE — 72197 MRI PELVIS W/O & W/DYE: CPT | Mod: 26

## 2025-08-12 PROCEDURE — 76498P: CUSTOM | Mod: 26

## 2025-08-18 ENCOUNTER — APPOINTMENT (OUTPATIENT)
Dept: UROLOGY | Facility: CLINIC | Age: 70
End: 2025-08-18
Payer: MEDICARE

## 2025-08-18 ENCOUNTER — RESULT REVIEW (OUTPATIENT)
Age: 70
End: 2025-08-18

## 2025-08-18 ENCOUNTER — APPOINTMENT (OUTPATIENT)
Dept: INTERNAL MEDICINE | Facility: CLINIC | Age: 70
End: 2025-08-18
Payer: MEDICARE

## 2025-08-18 ENCOUNTER — OUTPATIENT (OUTPATIENT)
Dept: OUTPATIENT SERVICES | Facility: HOSPITAL | Age: 70
LOS: 1 days | End: 2025-08-18
Payer: MEDICARE

## 2025-08-18 VITALS
SYSTOLIC BLOOD PRESSURE: 115 MMHG | WEIGHT: 156 LBS | TEMPERATURE: 98.7 F | HEART RATE: 65 BPM | DIASTOLIC BLOOD PRESSURE: 71 MMHG | BODY MASS INDEX: 21.13 KG/M2 | HEIGHT: 72 IN | OXYGEN SATURATION: 96 %

## 2025-08-18 VITALS
BODY MASS INDEX: 21.13 KG/M2 | SYSTOLIC BLOOD PRESSURE: 130 MMHG | OXYGEN SATURATION: 97 % | HEART RATE: 69 BPM | WEIGHT: 156 LBS | TEMPERATURE: 98 F | RESPIRATION RATE: 1 BRPM | DIASTOLIC BLOOD PRESSURE: 72 MMHG | HEIGHT: 72 IN

## 2025-08-18 VITALS — DIASTOLIC BLOOD PRESSURE: 76 MMHG | SYSTOLIC BLOOD PRESSURE: 116 MMHG

## 2025-08-18 VITALS — RESPIRATION RATE: 17 BRPM

## 2025-08-18 DIAGNOSIS — C61 MALIGNANT NEOPLASM OF PROSTATE: ICD-10-CM

## 2025-08-18 DIAGNOSIS — D72.18 POLYARTERITIS WITH LUNG INVOLVEMENT [CHURG-STRAUSS]: ICD-10-CM

## 2025-08-18 DIAGNOSIS — Z98.890 OTHER SPECIFIED POSTPROCEDURAL STATES: Chronic | ICD-10-CM

## 2025-08-18 DIAGNOSIS — M30.1 POLYARTERITIS WITH LUNG INVOLVEMENT [CHURG-STRAUSS]: ICD-10-CM

## 2025-08-18 DIAGNOSIS — R97.20 ELEVATED PROSTATE SPECIFIC ANTIGEN [PSA]: ICD-10-CM

## 2025-08-18 PROCEDURE — G2211 COMPLEX E/M VISIT ADD ON: CPT

## 2025-08-18 PROCEDURE — 94010 BREATHING CAPACITY TEST: CPT

## 2025-08-18 PROCEDURE — 99214 OFFICE O/P EST MOD 30 MIN: CPT

## 2025-08-18 PROCEDURE — 99213 OFFICE O/P EST LOW 20 MIN: CPT | Mod: 25

## 2025-08-18 PROCEDURE — C8001: CPT

## 2025-08-19 DIAGNOSIS — R31.29 OTHER MICROSCOPIC HEMATURIA: ICD-10-CM

## 2025-08-19 DIAGNOSIS — E87.5 HYPERKALEMIA: ICD-10-CM

## 2025-08-21 ENCOUNTER — TRANSCRIPTION ENCOUNTER (OUTPATIENT)
Age: 70
End: 2025-08-21

## 2025-08-21 PROBLEM — R31.29 MICROSCOPIC HEMATURIA: Status: ACTIVE | Noted: 2025-08-21

## 2025-08-21 PROBLEM — E87.5 SERUM POTASSIUM ELEVATED: Status: ACTIVE | Noted: 2025-08-21

## 2025-08-21 LAB
ANION GAP SERPL CALC-SCNC: 16 MMOL/L
APPEARANCE: CLEAR
BACTERIA UR CULT: NORMAL
BACTERIA: NEGATIVE /HPF
BILIRUBIN URINE: NEGATIVE
BLOOD URINE: NEGATIVE
BUN SERPL-MCNC: 16 MG/DL
CALCIUM OXALATE CRYSTALS: PRESENT
CALCIUM SERPL-MCNC: 9.4 MG/DL
CAST: 2 /LPF
CHLORIDE SERPL-SCNC: 101 MMOL/L
CO2 SERPL-SCNC: 23 MMOL/L
COLOR: NORMAL
CREAT SERPL-MCNC: 0.79 MG/DL
EGFRCR SERPLBLD CKD-EPI 2021: 96 ML/MIN/1.73M2
EPITHELIAL CELLS: 0 /HPF
GLUCOSE QUALITATIVE U: NEGATIVE MG/DL
GLUCOSE SERPL-MCNC: 137 MG/DL
HCT VFR BLD CALC: 43.1 %
HGB BLD-MCNC: 13.5 G/DL
KETONES URINE: ABNORMAL MG/DL
LEUKOCYTE ESTERASE URINE: ABNORMAL
MCHC RBC-ENTMCNC: 30 PG
MCHC RBC-ENTMCNC: 31.3 G/DL
MCV RBC AUTO: 95.8 FL
MICROSCOPIC-UA: NORMAL
NITRITE URINE: NEGATIVE
PH URINE: 6
PLATELET # BLD AUTO: 300 K/UL
POTASSIUM SERPL-SCNC: 5.9 MMOL/L
PROTEIN URINE: NORMAL MG/DL
PSA FREE FLD-MCNC: 21 %
PSA FREE SERPL-MCNC: 0.35 NG/ML
PSA SERPL-MCNC: 1.69 NG/ML
RBC # BLD: 4.5 M/UL
RBC # FLD: 12.4 %
RED BLOOD CELLS URINE: 7 /HPF
REVIEW: NORMAL
SODIUM SERPL-SCNC: 140 MMOL/L
SPECIFIC GRAVITY URINE: 1.03
UROBILINOGEN URINE: 1 MG/DL
WBC # FLD AUTO: 6.75 K/UL
WHITE BLOOD CELLS URINE: 1 /HPF

## 2025-08-22 ENCOUNTER — TRANSCRIPTION ENCOUNTER (OUTPATIENT)
Age: 70
End: 2025-08-22

## 2025-08-25 ENCOUNTER — APPOINTMENT (OUTPATIENT)
Dept: ORTHOPEDIC SURGERY | Facility: CLINIC | Age: 70
End: 2025-08-25
Payer: MEDICARE

## 2025-08-25 DIAGNOSIS — M67.814 OTHER SPECIFIED DISORDERS OF TENDON, LEFT SHOULDER: ICD-10-CM

## 2025-08-25 DIAGNOSIS — M75.02 ADHESIVE CAPSULITIS OF LEFT SHOULDER: ICD-10-CM

## 2025-08-25 PROCEDURE — 20611 DRAIN/INJ JOINT/BURSA W/US: CPT | Mod: LT

## 2025-08-25 PROCEDURE — 99204 OFFICE O/P NEW MOD 45 MIN: CPT | Mod: 25

## 2025-08-25 PROCEDURE — 73030 X-RAY EXAM OF SHOULDER: CPT | Mod: LT

## 2025-08-26 ENCOUNTER — TRANSCRIPTION ENCOUNTER (OUTPATIENT)
Age: 70
End: 2025-08-26

## 2025-08-26 LAB
ANION GAP SERPL CALC-SCNC: 16 MMOL/L
BUN SERPL-MCNC: 14 MG/DL
CALCIUM SERPL-MCNC: 9.5 MG/DL
CHLORIDE SERPL-SCNC: 105 MMOL/L
CO2 SERPL-SCNC: 23 MMOL/L
CREAT SERPL-MCNC: 0.79 MG/DL
EGFRCR SERPLBLD CKD-EPI 2021: 96 ML/MIN/1.73M2
GLUCOSE SERPL-MCNC: 89 MG/DL
POTASSIUM SERPL-SCNC: 4.3 MMOL/L
SODIUM SERPL-SCNC: 143 MMOL/L

## 2025-08-27 ENCOUNTER — TRANSCRIPTION ENCOUNTER (OUTPATIENT)
Age: 70
End: 2025-08-27

## 2025-09-03 ENCOUNTER — NON-APPOINTMENT (OUTPATIENT)
Age: 70
End: 2025-09-03

## 2025-09-05 ENCOUNTER — NON-APPOINTMENT (OUTPATIENT)
Age: 70
End: 2025-09-05

## (undated) DEVICE — GLV 8 PROTEXIS (WHITE)

## (undated) DEVICE — DRAPE C ARM 41X74"

## (undated) DEVICE — PREP CHLORAPREP HI-LITE ORANGE 26ML

## (undated) DEVICE — SYR LUER LOK 10CC

## (undated) DEVICE — TUBING RANGER FLUID IRRIGATION SET DISP

## (undated) DEVICE — GLV 7.5 PROTEXIS (WHITE)

## (undated) DEVICE — VENODYNE/SCD SLEEVE CALF MEDIUM

## (undated) DEVICE — NDL MAX CORE 18G X 25CM

## (undated) DEVICE — BOSTON SCIENTIFIC UROLOK II SCOPE ADAPTOR

## (undated) DEVICE — GRID BRACHYTHERAPY EZ 18G

## (undated) DEVICE — PACK CYSTO

## (undated) DEVICE — SYR CATHETER TIP 50ML

## (undated) DEVICE — DRAPE TOWEL BLUE 17" X 24"

## (undated) DEVICE — DRAPE MEDIUM SHEET 44" X 70"

## (undated) DEVICE — PLASTIC SOLUTION BOWL 160Z

## (undated) DEVICE — NDL HYPO REGULAR BEVEL 25G X 1.5" (BLUE)

## (undated) DEVICE — BALLOON ENDOCAVITY 2X14CM

## (undated) DEVICE — GOWN ROYAL SILK XL

## (undated) DEVICE — DRSG TELFA 3 X 8

## (undated) DEVICE — NDL BIOPSY CHIBA 22G X 20CM

## (undated) DEVICE — FOLEY CATH 2-WAY 18FR 30CC LATEX COUDE RED

## (undated) DEVICE — BOSTON SCIENTIFC PUMPING SYSTEM SAPS SINGLE ACTION 10CC

## (undated) DEVICE — SYR LUER LOK 50CC